# Patient Record
Sex: MALE | Race: OTHER | HISPANIC OR LATINO | URBAN - METROPOLITAN AREA
[De-identification: names, ages, dates, MRNs, and addresses within clinical notes are randomized per-mention and may not be internally consistent; named-entity substitution may affect disease eponyms.]

---

## 2018-04-13 ENCOUNTER — EMERGENCY (EMERGENCY)
Facility: HOSPITAL | Age: 53
LOS: 1 days | Discharge: HOME | End: 2018-04-13
Attending: EMERGENCY MEDICINE
Payer: MEDICAID

## 2018-04-13 VITALS
TEMPERATURE: 98 F | SYSTOLIC BLOOD PRESSURE: 142 MMHG | HEART RATE: 77 BPM | RESPIRATION RATE: 20 BRPM | OXYGEN SATURATION: 98 % | DIASTOLIC BLOOD PRESSURE: 87 MMHG

## 2018-04-13 DIAGNOSIS — Z98.89 OTHER SPECIFIED POSTPROCEDURAL STATES: Chronic | ICD-10-CM

## 2018-04-13 PROCEDURE — 72070 X-RAY EXAM THORAC SPINE 2VWS: CPT

## 2018-04-13 PROCEDURE — 99283 EMERGENCY DEPT VISIT LOW MDM: CPT | Mod: 25

## 2018-04-13 PROCEDURE — 72070 X-RAY EXAM THORAC SPINE 2VWS: CPT | Mod: 26

## 2018-04-13 PROCEDURE — 96372 THER/PROPH/DIAG INJ SC/IM: CPT

## 2018-04-13 PROCEDURE — T1013: CPT

## 2018-04-13 PROCEDURE — 99283 EMERGENCY DEPT VISIT LOW MDM: CPT

## 2018-04-13 RX ORDER — IBUPROFEN 200 MG
1 TABLET ORAL
Qty: 50 | Refills: 0 | OUTPATIENT
Start: 2018-04-13

## 2018-04-13 RX ORDER — KETOROLAC TROMETHAMINE 30 MG/ML
15 SYRINGE (ML) INJECTION ONCE
Qty: 0 | Refills: 0 | Status: DISCONTINUED | OUTPATIENT
Start: 2018-04-13 | End: 2018-04-13

## 2018-04-13 RX ORDER — METHOCARBAMOL 500 MG/1
1500 TABLET, FILM COATED ORAL ONCE
Qty: 0 | Refills: 0 | Status: COMPLETED | OUTPATIENT
Start: 2018-04-13 | End: 2018-04-13

## 2018-04-13 RX ADMIN — Medication 15 MILLIGRAM(S): at 14:05

## 2018-04-13 RX ADMIN — METHOCARBAMOL 1500 MILLIGRAM(S): 500 TABLET, FILM COATED ORAL at 14:05

## 2018-04-13 NOTE — ED PROVIDER NOTE - ATTENDING CONTRIBUTION TO CARE
mid back pain following an injury at gym.  no head or neck trauma.  no prior back problems.  no injury to arms/ legs.  PE;  nontoxic appearing, NAD, no gross motor abnormlities.

## 2018-04-13 NOTE — ED PROVIDER NOTE - OBJECTIVE STATEMENT
Patient presents c/o thoracic back pain. patient states was using a cable machine at gym when he slipped and fell backwards. patient c/o pain to thoracic back, no loc, no ha, no n/v. no radiation of pain, no weakness,

## 2018-04-13 NOTE — ED PROVIDER NOTE - PHYSICAL EXAMINATION
Musculoskeletal positive midline thoracic tenderness,  ROM intact, positive paraspinal muscle tenderness.  no stepoffs

## 2018-04-13 NOTE — ED ADULT TRIAGE NOTE - CHIEF COMPLAINT QUOTE
Pt was working with a machine at work and hurt his back. c/o back pain Pt was working with a machine at gym and hurt his back. c/o back pain

## 2018-08-14 ENCOUNTER — EMERGENCY (EMERGENCY)
Facility: HOSPITAL | Age: 53
LOS: 1 days | Discharge: HOME | End: 2018-08-14
Attending: EMERGENCY MEDICINE
Payer: COMMERCIAL

## 2018-08-14 VITALS
RESPIRATION RATE: 18 BRPM | TEMPERATURE: 98 F | HEART RATE: 58 BPM | HEIGHT: 64 IN | WEIGHT: 186.95 LBS | DIASTOLIC BLOOD PRESSURE: 88 MMHG | SYSTOLIC BLOOD PRESSURE: 154 MMHG | OXYGEN SATURATION: 97 %

## 2018-08-14 VITALS
SYSTOLIC BLOOD PRESSURE: 161 MMHG | TEMPERATURE: 98 F | HEART RATE: 56 BPM | OXYGEN SATURATION: 98 % | RESPIRATION RATE: 18 BRPM | DIASTOLIC BLOOD PRESSURE: 87 MMHG

## 2018-08-14 DIAGNOSIS — Z98.89 OTHER SPECIFIED POSTPROCEDURAL STATES: Chronic | ICD-10-CM

## 2018-08-14 PROCEDURE — T1013: CPT

## 2018-08-14 PROCEDURE — 99284 EMERGENCY DEPT VISIT MOD MDM: CPT

## 2018-08-14 PROCEDURE — 99283 EMERGENCY DEPT VISIT LOW MDM: CPT

## 2018-08-14 RX ORDER — ACETAMINOPHEN 500 MG
650 TABLET ORAL ONCE
Qty: 0 | Refills: 0 | Status: COMPLETED | OUTPATIENT
Start: 2018-08-14 | End: 2018-08-14

## 2018-08-14 RX ADMIN — Medication 650 MILLIGRAM(S): at 19:53

## 2018-08-14 NOTE — ED PROVIDER NOTE - PHYSICAL EXAMINATION
neuro: CN II - XII intact, EOMI, PERRL, no papilledema, 5/5 muscle strength x 4 extremities, no sensory deficits, 2+ dtr globally, negative babinski, no ataxic gait, normal MAULIK and FNT, normal romberg
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
## 2018-08-14 NOTE — ED PROVIDER NOTE - MEDICAL DECISION MAKING DETAILS
no + nexus criteria. return to ed for intractable HA, persistent vomiting, or new onset motor/sensory deficits

## 2018-08-14 NOTE — ED ADULT NURSE NOTE - NSIMPLEMENTINTERV_GEN_ALL_ED
Implemented All Universal Safety Interventions:  [place] to call system. Call bell, personal items and telephone within reach. Instruct patient to call for assistance. Room bathroom lighting operational. Non-slip footwear when patient is off stretcher. Physically safe environment: no spills, clutter or unnecessary equipment. Stretcher in lowest position, wheels locked, appropriate side rails in place.

## 2019-01-18 ENCOUNTER — EMERGENCY (EMERGENCY)
Facility: HOSPITAL | Age: 54
LOS: 1 days | Discharge: HOME | End: 2019-01-18
Attending: EMERGENCY MEDICINE
Payer: MEDICAID

## 2019-01-18 VITALS
TEMPERATURE: 98 F | SYSTOLIC BLOOD PRESSURE: 173 MMHG | OXYGEN SATURATION: 100 % | RESPIRATION RATE: 18 BRPM | HEART RATE: 69 BPM | DIASTOLIC BLOOD PRESSURE: 92 MMHG | HEIGHT: 65 IN | WEIGHT: 179.9 LBS

## 2019-01-18 VITALS
DIASTOLIC BLOOD PRESSURE: 69 MMHG | TEMPERATURE: 98 F | OXYGEN SATURATION: 90 % | RESPIRATION RATE: 18 BRPM | SYSTOLIC BLOOD PRESSURE: 143 MMHG | HEART RATE: 65 BPM

## 2019-01-18 DIAGNOSIS — Z98.89 OTHER SPECIFIED POSTPROCEDURAL STATES: Chronic | ICD-10-CM

## 2019-01-18 LAB
ALBUMIN SERPL ELPH-MCNC: 4 G/DL — SIGNIFICANT CHANGE UP (ref 3.3–5.2)
ALP SERPL-CCNC: 77 U/L — SIGNIFICANT CHANGE UP (ref 40–120)
ALT FLD-CCNC: 31 U/L — SIGNIFICANT CHANGE UP
ANION GAP SERPL CALC-SCNC: 13 MMOL/L — SIGNIFICANT CHANGE UP (ref 5–17)
APTT BLD: 31.6 SEC — SIGNIFICANT CHANGE UP (ref 27.5–36.3)
AST SERPL-CCNC: 35 U/L — SIGNIFICANT CHANGE UP
BILIRUB SERPL-MCNC: 0.9 MG/DL — SIGNIFICANT CHANGE UP (ref 0.4–2)
BUN SERPL-MCNC: 13 MG/DL — SIGNIFICANT CHANGE UP (ref 8–20)
CALCIUM SERPL-MCNC: 8.8 MG/DL — SIGNIFICANT CHANGE UP (ref 8.6–10.2)
CHLORIDE SERPL-SCNC: 102 MMOL/L — SIGNIFICANT CHANGE UP (ref 98–107)
CO2 SERPL-SCNC: 22 MMOL/L — SIGNIFICANT CHANGE UP (ref 22–29)
CREAT SERPL-MCNC: 0.54 MG/DL — SIGNIFICANT CHANGE UP (ref 0.5–1.3)
D DIMER BLD IA.RAPID-MCNC: <150 NG/ML DDU — SIGNIFICANT CHANGE UP
GLUCOSE SERPL-MCNC: 97 MG/DL — SIGNIFICANT CHANGE UP (ref 70–115)
HCT VFR BLD CALC: 47.1 % — SIGNIFICANT CHANGE UP (ref 42–52)
HGB BLD-MCNC: 16.3 G/DL — SIGNIFICANT CHANGE UP (ref 14–18)
INR BLD: 1.06 RATIO — SIGNIFICANT CHANGE UP (ref 0.88–1.16)
LIDOCAIN IGE QN: 30 U/L — SIGNIFICANT CHANGE UP (ref 22–51)
MAGNESIUM SERPL-MCNC: 2.1 MG/DL — SIGNIFICANT CHANGE UP (ref 1.6–2.6)
MCHC RBC-ENTMCNC: 30.1 PG — SIGNIFICANT CHANGE UP (ref 27–31)
MCHC RBC-ENTMCNC: 34.6 G/DL — SIGNIFICANT CHANGE UP (ref 32–36)
MCV RBC AUTO: 87.1 FL — SIGNIFICANT CHANGE UP (ref 80–94)
NT-PROBNP SERPL-SCNC: 8 PG/ML — SIGNIFICANT CHANGE UP (ref 0–300)
PLATELET # BLD AUTO: 229 K/UL — SIGNIFICANT CHANGE UP (ref 150–400)
POTASSIUM SERPL-MCNC: 4.4 MMOL/L — SIGNIFICANT CHANGE UP (ref 3.5–5.3)
POTASSIUM SERPL-SCNC: 4.4 MMOL/L — SIGNIFICANT CHANGE UP (ref 3.5–5.3)
PROT SERPL-MCNC: 7.2 G/DL — SIGNIFICANT CHANGE UP (ref 6.6–8.7)
PROTHROM AB SERPL-ACNC: 12.2 SEC — SIGNIFICANT CHANGE UP (ref 10–12.9)
RBC # BLD: 5.41 M/UL — SIGNIFICANT CHANGE UP (ref 4.6–6.2)
RBC # FLD: 13.2 % — SIGNIFICANT CHANGE UP (ref 11–15.6)
SODIUM SERPL-SCNC: 137 MMOL/L — SIGNIFICANT CHANGE UP (ref 135–145)
TROPONIN T SERPL-MCNC: <0.01 NG/ML — SIGNIFICANT CHANGE UP (ref 0–0.06)
TROPONIN T SERPL-MCNC: <0.01 NG/ML — SIGNIFICANT CHANGE UP (ref 0–0.06)
WBC # BLD: 5.3 K/UL — SIGNIFICANT CHANGE UP (ref 4.8–10.8)
WBC # FLD AUTO: 5.3 K/UL — SIGNIFICANT CHANGE UP (ref 4.8–10.8)

## 2019-01-18 PROCEDURE — 83735 ASSAY OF MAGNESIUM: CPT

## 2019-01-18 PROCEDURE — 85610 PROTHROMBIN TIME: CPT

## 2019-01-18 PROCEDURE — 93010 ELECTROCARDIOGRAM REPORT: CPT

## 2019-01-18 PROCEDURE — 84484 ASSAY OF TROPONIN QUANT: CPT

## 2019-01-18 PROCEDURE — 71045 X-RAY EXAM CHEST 1 VIEW: CPT | Mod: 26

## 2019-01-18 PROCEDURE — 99285 EMERGENCY DEPT VISIT HI MDM: CPT

## 2019-01-18 PROCEDURE — 85730 THROMBOPLASTIN TIME PARTIAL: CPT

## 2019-01-18 PROCEDURE — 85379 FIBRIN DEGRADATION QUANT: CPT

## 2019-01-18 PROCEDURE — 83690 ASSAY OF LIPASE: CPT

## 2019-01-18 PROCEDURE — 36415 COLL VENOUS BLD VENIPUNCTURE: CPT

## 2019-01-18 PROCEDURE — 80053 COMPREHEN METABOLIC PANEL: CPT

## 2019-01-18 PROCEDURE — 71045 X-RAY EXAM CHEST 1 VIEW: CPT

## 2019-01-18 PROCEDURE — T1013: CPT

## 2019-01-18 PROCEDURE — 85027 COMPLETE CBC AUTOMATED: CPT

## 2019-01-18 PROCEDURE — 99284 EMERGENCY DEPT VISIT MOD MDM: CPT | Mod: 25

## 2019-01-18 PROCEDURE — 83880 ASSAY OF NATRIURETIC PEPTIDE: CPT

## 2019-01-18 PROCEDURE — 96374 THER/PROPH/DIAG INJ IV PUSH: CPT

## 2019-01-18 PROCEDURE — 93005 ELECTROCARDIOGRAM TRACING: CPT

## 2019-01-18 RX ORDER — NITROGLYCERIN 6.5 MG
1 CAPSULE, EXTENDED RELEASE ORAL ONCE
Qty: 0 | Refills: 0 | Status: COMPLETED | OUTPATIENT
Start: 2019-01-18 | End: 2019-01-18

## 2019-01-18 RX ORDER — ASPIRIN/CALCIUM CARB/MAGNESIUM 324 MG
325 TABLET ORAL ONCE
Qty: 0 | Refills: 0 | Status: COMPLETED | OUTPATIENT
Start: 2019-01-18 | End: 2019-01-18

## 2019-01-18 RX ORDER — KETOROLAC TROMETHAMINE 30 MG/ML
15 SYRINGE (ML) INJECTION ONCE
Qty: 0 | Refills: 0 | Status: DISCONTINUED | OUTPATIENT
Start: 2019-01-18 | End: 2019-01-18

## 2019-01-18 RX ADMIN — Medication 15 MILLIGRAM(S): at 19:40

## 2019-01-18 RX ADMIN — Medication 325 MILLIGRAM(S): at 15:14

## 2019-01-18 RX ADMIN — Medication 1 INCH(S): at 15:14

## 2019-01-18 RX ADMIN — Medication 15 MILLIGRAM(S): at 19:25

## 2019-01-18 NOTE — ED PROVIDER NOTE - PROGRESS NOTE DETAILS
The patient presents with atypical chest pain worsening with movement and palpation.  Two trop negative and CXR and EKG negative.  Will DC home and will follow up with PMD

## 2019-01-18 NOTE — ED PROVIDER NOTE - OBJECTIVE STATEMENT
Pt is 52 yo male with no PMHx of cardiovascular disease present to ER co chest pain. Pain is constant, it stated this morning at 7;OO AM while taking a shower. Pain is 10/10, radiates to eft arm and back, nothing make it better but exacerbates with movements. Pt feels fatigue, week and sad since the chest pain started

## 2019-01-18 NOTE — ED PROVIDER NOTE - CHPI ED SYMPTOMS NEG
no dizziness/no fever/no nausea/no syncope/no vomiting/no cough/no shortness of breath/no back pain/no diaphoresis/no chills

## 2019-01-18 NOTE — ED PROVIDER NOTE - CARDIAC, MLM
Normal rate, regular rhythm.  Heart sounds S1, S2.  No murmurs, rubs or gallops. Normal rate, regular rhythm.  Heart sounds S1, S2.  No murmurs, rubs or gallops. +Chest Wall TTP on L side

## 2019-01-18 NOTE — ED ADULT NURSE NOTE - OBJECTIVE STATEMENT
assumed pt care at 1240. pt A&O X 4. States he has chest pain that radiates down left arm and to back since this morning. States he also has dyspnea on exertion and the chest pain becomes worse with respirations. No diaphoresis noted, color good. Denies any nausea, vomiting, dizziness, headaches, numbness/tingling. States he had abdominal surgery about 8 months ago but does not know exactly what was done. Chest pain currently 8/10. Safety maintained. NSR on monitor. Will continue to monitor.

## 2019-01-28 ENCOUNTER — EMERGENCY (EMERGENCY)
Facility: HOSPITAL | Age: 54
LOS: 1 days | Discharge: HOME | End: 2019-01-28
Attending: EMERGENCY MEDICINE
Payer: MEDICAID

## 2019-01-28 VITALS
OXYGEN SATURATION: 99 % | SYSTOLIC BLOOD PRESSURE: 145 MMHG | HEART RATE: 59 BPM | RESPIRATION RATE: 20 BRPM | DIASTOLIC BLOOD PRESSURE: 70 MMHG

## 2019-01-28 VITALS
DIASTOLIC BLOOD PRESSURE: 85 MMHG | OXYGEN SATURATION: 98 % | HEART RATE: 63 BPM | RESPIRATION RATE: 18 BRPM | TEMPERATURE: 98 F | HEIGHT: 66 IN | WEIGHT: 190.04 LBS | SYSTOLIC BLOOD PRESSURE: 137 MMHG

## 2019-01-28 DIAGNOSIS — Z98.89 OTHER SPECIFIED POSTPROCEDURAL STATES: Chronic | ICD-10-CM

## 2019-01-28 LAB
ALBUMIN SERPL ELPH-MCNC: 4.2 G/DL — SIGNIFICANT CHANGE UP (ref 3.3–5.2)
ALP SERPL-CCNC: 83 U/L — SIGNIFICANT CHANGE UP (ref 40–120)
ALT FLD-CCNC: 25 U/L — SIGNIFICANT CHANGE UP
ANION GAP SERPL CALC-SCNC: 6 MMOL/L — SIGNIFICANT CHANGE UP (ref 5–17)
AST SERPL-CCNC: 25 U/L — SIGNIFICANT CHANGE UP
BILIRUB SERPL-MCNC: 0.7 MG/DL — SIGNIFICANT CHANGE UP (ref 0.4–2)
BUN SERPL-MCNC: 15 MG/DL — SIGNIFICANT CHANGE UP (ref 8–20)
CALCIUM SERPL-MCNC: 9.1 MG/DL — SIGNIFICANT CHANGE UP (ref 8.6–10.2)
CHLORIDE SERPL-SCNC: 105 MMOL/L — SIGNIFICANT CHANGE UP (ref 98–107)
CO2 SERPL-SCNC: 30 MMOL/L — HIGH (ref 22–29)
CREAT SERPL-MCNC: 0.79 MG/DL — SIGNIFICANT CHANGE UP (ref 0.5–1.3)
GLUCOSE SERPL-MCNC: 109 MG/DL — SIGNIFICANT CHANGE UP (ref 70–115)
HCT VFR BLD CALC: 45.3 % — SIGNIFICANT CHANGE UP (ref 42–52)
HGB BLD-MCNC: 15 G/DL — SIGNIFICANT CHANGE UP (ref 14–18)
LIDOCAIN IGE QN: 42 U/L — SIGNIFICANT CHANGE UP (ref 22–51)
MAGNESIUM SERPL-MCNC: 2.3 MG/DL — SIGNIFICANT CHANGE UP (ref 1.6–2.6)
MCHC RBC-ENTMCNC: 28.6 PG — SIGNIFICANT CHANGE UP (ref 27–31)
MCHC RBC-ENTMCNC: 33.1 G/DL — SIGNIFICANT CHANGE UP (ref 32–36)
MCV RBC AUTO: 86.3 FL — SIGNIFICANT CHANGE UP (ref 80–94)
NT-PROBNP SERPL-SCNC: 15 PG/ML — SIGNIFICANT CHANGE UP (ref 0–300)
PLATELET # BLD AUTO: 221 K/UL — SIGNIFICANT CHANGE UP (ref 150–400)
POTASSIUM SERPL-MCNC: 4.1 MMOL/L — SIGNIFICANT CHANGE UP (ref 3.5–5.3)
POTASSIUM SERPL-SCNC: 4.1 MMOL/L — SIGNIFICANT CHANGE UP (ref 3.5–5.3)
PROT SERPL-MCNC: 7.4 G/DL — SIGNIFICANT CHANGE UP (ref 6.6–8.7)
RBC # BLD: 5.25 M/UL — SIGNIFICANT CHANGE UP (ref 4.6–6.2)
RBC # FLD: 12.8 % — SIGNIFICANT CHANGE UP (ref 11–15.6)
SODIUM SERPL-SCNC: 141 MMOL/L — SIGNIFICANT CHANGE UP (ref 135–145)
TROPONIN T SERPL-MCNC: <0.01 NG/ML — SIGNIFICANT CHANGE UP (ref 0–0.06)
WBC # BLD: 4.8 K/UL — SIGNIFICANT CHANGE UP (ref 4.8–10.8)
WBC # FLD AUTO: 4.8 K/UL — SIGNIFICANT CHANGE UP (ref 4.8–10.8)

## 2019-01-28 PROCEDURE — 83735 ASSAY OF MAGNESIUM: CPT

## 2019-01-28 PROCEDURE — 93005 ELECTROCARDIOGRAM TRACING: CPT

## 2019-01-28 PROCEDURE — 84484 ASSAY OF TROPONIN QUANT: CPT

## 2019-01-28 PROCEDURE — 83690 ASSAY OF LIPASE: CPT

## 2019-01-28 PROCEDURE — 99284 EMERGENCY DEPT VISIT MOD MDM: CPT | Mod: 25

## 2019-01-28 PROCEDURE — 80053 COMPREHEN METABOLIC PANEL: CPT

## 2019-01-28 PROCEDURE — T1013: CPT

## 2019-01-28 PROCEDURE — 85027 COMPLETE CBC AUTOMATED: CPT

## 2019-01-28 PROCEDURE — 99285 EMERGENCY DEPT VISIT HI MDM: CPT | Mod: 25

## 2019-01-28 PROCEDURE — 83880 ASSAY OF NATRIURETIC PEPTIDE: CPT

## 2019-01-28 PROCEDURE — 71046 X-RAY EXAM CHEST 2 VIEWS: CPT | Mod: 26

## 2019-01-28 PROCEDURE — 75574 CT ANGIO HRT W/3D IMAGE: CPT | Mod: 26

## 2019-01-28 PROCEDURE — 36415 COLL VENOUS BLD VENIPUNCTURE: CPT

## 2019-01-28 PROCEDURE — 93010 ELECTROCARDIOGRAM REPORT: CPT

## 2019-01-28 PROCEDURE — 71046 X-RAY EXAM CHEST 2 VIEWS: CPT

## 2019-01-28 PROCEDURE — 75574 CT ANGIO HRT W/3D IMAGE: CPT

## 2019-01-28 RX ORDER — IBUPROFEN 200 MG
600 TABLET ORAL ONCE
Qty: 0 | Refills: 0 | Status: COMPLETED | OUTPATIENT
Start: 2019-01-28 | End: 2019-01-28

## 2019-01-28 RX ORDER — METOPROLOL TARTRATE 50 MG
50 TABLET ORAL ONCE
Qty: 0 | Refills: 0 | Status: COMPLETED | OUTPATIENT
Start: 2019-01-28 | End: 2019-01-28

## 2019-01-28 RX ADMIN — Medication 600 MILLIGRAM(S): at 06:38

## 2019-01-28 RX ADMIN — Medication 600 MILLIGRAM(S): at 07:23

## 2019-01-28 NOTE — ED PROVIDER NOTE - OBJECTIVE STATEMENT
54 yo male presents for evaluation of chest pain. Patient states he was seen in the [facility] one week ago. He continues to have pain. He was seen by [facility] whom has scheduled further testing ( ECHO, stress test, labs) this Wednesday. He states the pain is daily but intermittent. No discernible pattern. Patient also complains of episode of nausea and dyspnea.   : Jessica  Cards: [facility]

## 2019-01-28 NOTE — ED PROVIDER NOTE - ENMT, MLM
98.1 Airway patent, Nasal mucosa clear. Mouth with normal mucosa. Throat has no vesicles, no oropharyngeal exudates and uvula is midline.

## 2019-01-28 NOTE — ED PROVIDER NOTE - PROGRESS NOTE DETAILS
Received patient signout from Dr. Limon. Patient with chest pain seen in ER recently followed up with [facility] scheduled for outpatient workup c/o continued pain.  Pending cardiac CTA.

## 2019-01-28 NOTE — ED ADULT NURSE REASSESSMENT NOTE - NS ED NURSE REASSESS COMMENT FT1
assumed pt care this am, for cardiac CT, will call them. HR is 56. will keep NPO for now, chart reviewed

## 2019-01-28 NOTE — ED ADULT NURSE NOTE - OBJECTIVE STATEMENT
Patient received alert and oriented x4 states that he has had pain x9 days, states that he was seen here last week for the same and the pain has not subsided. patient does not appear to be in any respiratory distress, states that he does need a cardiac stress test and consult with cardiologist

## 2019-01-28 NOTE — CONSULT NOTE ADULT - SUBJECTIVE AND OBJECTIVE BOX
[facility], [facility]                                          [address], [address], [place] [address]                                               PHONE: [phone]    FAX: [phone] [address], [address], [place] [address]                                       PHONE: [phone]    FAX: [phone]  *******************************************************************************    Reason for Consult: Chest pain    HPI:  JAMIA SUTHERLAND is a 53y man with no prior medical history who presents to the ER for evaluation of chest pain.  The patient reports constant left sided chest pain and upper back pain for the last 1-2 weeks.  The pain is worse when sitting in certain positions and with movements but no relation to exertion.  The pain is non-radiating.  No diaphoresis, palpitations, shortness of breath, syncope or near syncope.     PAST MEDICAL & SURGICAL HISTORY:  No pertinent past medical history  S/P shoulder surgery  S/P hernia surgery      No Known Allergies      MEDICATIONS  (STANDING):  metoprolol tartrate 50 milliGRAM(s) Oral Once    MEDICATIONS  (PRN):      Social History: no active tobacco / EtOH / IVDA    Family History: No pertinent family history in first degree relatives      ROS: As noted above, otherwise unremarkable.    Vital Signs Last 24 Hrs  T(C): 36.6 (28 Jan 2019 05:53), Max: 36.6 (28 Jan 2019 05:53)  T(F): 97.8 (28 Jan 2019 05:53), Max: 97.8 (28 Jan 2019 05:53)  HR: 56 (28 Jan 2019 07:21) (56 - 63)  BP: 166/82 (28 Jan 2019 07:21) (137/85 - 166/82)  RR: 18 (28 Jan 2019 07:21) (18 - 18)  SpO2: 100% (28 Jan 2019 07:21) (98% - 100%)    I&O's Detail    I&O's Summary      PHYSICAL EXAM:  General: Appears well developed, well nourished, no acute distress  HEENT: Head: normocephalic, atraumatic  Eyes: Pupils equal and reactive  Neck: Supple, no carotid bruit, no JVD, no HJR  CARDIOVASCULAR: Normal S1 and S2, no murmur, rub, or gallop  LUNGS: Clear to auscultation bilaterally, no rales, rhonchi or wheeze  CHEST: There is tenderness to palpation about anterior chest wall and upper back which reproduces the patient's reported pain.  ABDOMEN: Soft, nontender, non-distended, positive bowel sounds, no mass or bruit  EXTREMITIES: No edema, distal pulses WNL  SKIN: Warm and dry with normal turgor  NEURO: Alert & oriented x 3, grossly intact  PSYCH: normal mood and affect    LABS:                        15.0   4.8   )-----------( 221      ( 28 Jan 2019 06:41 )             45.3     01-28    141  |  105  |  15.0  ----------------------------<  109  4.1   |  30.0<H>  |  0.79    Ca    9.1      28 Jan 2019 06:41  Mg     2.3     01-28    TPro  7.4  /  Alb  4.2  /  TBili  0.7  /  DBili  x   /  AST  25  /  ALT  25  /  AlkPhos  83  01-28    CARDIAC MARKERS ( 28 Jan 2019 06:41 )  x     / <0.01 ng/mL / x     / x     / x          RADIOLOGY & ADDITIONAL STUDIES:     ECG: sinus rhythm    Assessment and Plan:  In summary, JAMIA SUTHERLAND is a 53y man with no prior medical history admitted for evaluation of chest pain.     - The patient's pain is atypical and most likely noncardiac.  He is without ischemic ECG abnormalities and negative serial troponin and has now ruled out for acute MI.   - No evidence of ischemia or CHF clinically, eventual ischemic evaluation (likely as outpatient)  - Rhythm/hemodynamics stable = BP intermittently elevated in ER likely secondary to pain.  Continue to monitor off medications for now.   - Echo and stress testing scheduled in office.    - Stable for discharge from cardiac perspective.     Thank you for allowing me to participate in the care of your patient.      Sincerely,    Hayden Hernandez M.D.

## 2019-10-16 ENCOUNTER — EMERGENCY (EMERGENCY)
Facility: HOSPITAL | Age: 54
LOS: 1 days | Discharge: HOME | End: 2019-10-16
Attending: EMERGENCY MEDICINE
Payer: MEDICAID

## 2019-10-16 VITALS
HEART RATE: 64 BPM | OXYGEN SATURATION: 100 % | DIASTOLIC BLOOD PRESSURE: 93 MMHG | SYSTOLIC BLOOD PRESSURE: 167 MMHG | RESPIRATION RATE: 16 BRPM

## 2019-10-16 VITALS
HEIGHT: 65 IN | OXYGEN SATURATION: 99 % | TEMPERATURE: 98 F | DIASTOLIC BLOOD PRESSURE: 95 MMHG | HEART RATE: 69 BPM | RESPIRATION RATE: 18 BRPM | WEIGHT: 190.04 LBS | SYSTOLIC BLOOD PRESSURE: 160 MMHG

## 2019-10-16 DIAGNOSIS — Z98.89 OTHER SPECIFIED POSTPROCEDURAL STATES: Chronic | ICD-10-CM

## 2019-10-16 LAB
ALBUMIN SERPL ELPH-MCNC: 4.7 G/DL — SIGNIFICANT CHANGE UP (ref 3.3–5.2)
ALP SERPL-CCNC: 83 U/L — SIGNIFICANT CHANGE UP (ref 40–120)
ALT FLD-CCNC: 24 U/L — SIGNIFICANT CHANGE UP
ANION GAP SERPL CALC-SCNC: 12 MMOL/L — SIGNIFICANT CHANGE UP (ref 5–17)
APTT BLD: 31.6 SEC — SIGNIFICANT CHANGE UP (ref 27.5–36.3)
AST SERPL-CCNC: 28 U/L — SIGNIFICANT CHANGE UP
BASOPHILS # BLD AUTO: 0.04 K/UL — SIGNIFICANT CHANGE UP (ref 0–0.2)
BASOPHILS NFR BLD AUTO: 0.4 % — SIGNIFICANT CHANGE UP (ref 0–2)
BILIRUB SERPL-MCNC: 0.9 MG/DL — SIGNIFICANT CHANGE UP (ref 0.4–2)
BUN SERPL-MCNC: 14 MG/DL — SIGNIFICANT CHANGE UP (ref 8–20)
CALCIUM SERPL-MCNC: 9.5 MG/DL — SIGNIFICANT CHANGE UP (ref 8.6–10.2)
CHLORIDE SERPL-SCNC: 100 MMOL/L — SIGNIFICANT CHANGE UP (ref 98–107)
CO2 SERPL-SCNC: 26 MMOL/L — SIGNIFICANT CHANGE UP (ref 22–29)
CREAT SERPL-MCNC: 0.72 MG/DL — SIGNIFICANT CHANGE UP (ref 0.5–1.3)
EOSINOPHIL # BLD AUTO: 0.45 K/UL — SIGNIFICANT CHANGE UP (ref 0–0.5)
EOSINOPHIL NFR BLD AUTO: 4.2 % — SIGNIFICANT CHANGE UP (ref 0–6)
GLUCOSE SERPL-MCNC: 115 MG/DL — SIGNIFICANT CHANGE UP (ref 70–115)
HCT VFR BLD CALC: 52 % — HIGH (ref 39–50)
HGB BLD-MCNC: 17.1 G/DL — HIGH (ref 13–17)
IMM GRANULOCYTES NFR BLD AUTO: 0.5 % — SIGNIFICANT CHANGE UP (ref 0–1.5)
INR BLD: 1.1 RATIO — SIGNIFICANT CHANGE UP (ref 0.88–1.16)
LYMPHOCYTES # BLD AUTO: 1.93 K/UL — SIGNIFICANT CHANGE UP (ref 1–3.3)
LYMPHOCYTES # BLD AUTO: 17.9 % — SIGNIFICANT CHANGE UP (ref 13–44)
MCHC RBC-ENTMCNC: 29.4 PG — SIGNIFICANT CHANGE UP (ref 27–34)
MCHC RBC-ENTMCNC: 32.9 GM/DL — SIGNIFICANT CHANGE UP (ref 32–36)
MCV RBC AUTO: 89.5 FL — SIGNIFICANT CHANGE UP (ref 80–100)
MONOCYTES # BLD AUTO: 0.6 K/UL — SIGNIFICANT CHANGE UP (ref 0–0.9)
MONOCYTES NFR BLD AUTO: 5.6 % — SIGNIFICANT CHANGE UP (ref 2–14)
NEUTROPHILS # BLD AUTO: 7.72 K/UL — HIGH (ref 1.8–7.4)
NEUTROPHILS NFR BLD AUTO: 71.4 % — SIGNIFICANT CHANGE UP (ref 43–77)
PLATELET # BLD AUTO: 224 K/UL — SIGNIFICANT CHANGE UP (ref 150–400)
POTASSIUM SERPL-MCNC: 4.4 MMOL/L — SIGNIFICANT CHANGE UP (ref 3.5–5.3)
POTASSIUM SERPL-SCNC: 4.4 MMOL/L — SIGNIFICANT CHANGE UP (ref 3.5–5.3)
PROT SERPL-MCNC: 8.1 G/DL — SIGNIFICANT CHANGE UP (ref 6.6–8.7)
PROTHROM AB SERPL-ACNC: 12.7 SEC — SIGNIFICANT CHANGE UP (ref 10–12.9)
RBC # BLD: 5.81 M/UL — HIGH (ref 4.2–5.8)
RBC # FLD: 12.4 % — SIGNIFICANT CHANGE UP (ref 10.3–14.5)
SODIUM SERPL-SCNC: 138 MMOL/L — SIGNIFICANT CHANGE UP (ref 135–145)
TROPONIN T SERPL-MCNC: <0.01 NG/ML — SIGNIFICANT CHANGE UP (ref 0–0.06)
WBC # BLD: 10.79 K/UL — HIGH (ref 3.8–10.5)
WBC # FLD AUTO: 10.79 K/UL — HIGH (ref 3.8–10.5)

## 2019-10-16 PROCEDURE — 71046 X-RAY EXAM CHEST 2 VIEWS: CPT

## 2019-10-16 PROCEDURE — 85610 PROTHROMBIN TIME: CPT

## 2019-10-16 PROCEDURE — 85027 COMPLETE CBC AUTOMATED: CPT

## 2019-10-16 PROCEDURE — T1013: CPT

## 2019-10-16 PROCEDURE — 99284 EMERGENCY DEPT VISIT MOD MDM: CPT

## 2019-10-16 PROCEDURE — 85730 THROMBOPLASTIN TIME PARTIAL: CPT

## 2019-10-16 PROCEDURE — 73030 X-RAY EXAM OF SHOULDER: CPT | Mod: 26,LT

## 2019-10-16 PROCEDURE — 99284 EMERGENCY DEPT VISIT MOD MDM: CPT | Mod: 25

## 2019-10-16 PROCEDURE — 71046 X-RAY EXAM CHEST 2 VIEWS: CPT | Mod: 26

## 2019-10-16 PROCEDURE — 93005 ELECTROCARDIOGRAM TRACING: CPT

## 2019-10-16 PROCEDURE — 80053 COMPREHEN METABOLIC PANEL: CPT

## 2019-10-16 PROCEDURE — 36415 COLL VENOUS BLD VENIPUNCTURE: CPT

## 2019-10-16 PROCEDURE — 93010 ELECTROCARDIOGRAM REPORT: CPT

## 2019-10-16 PROCEDURE — 96372 THER/PROPH/DIAG INJ SC/IM: CPT

## 2019-10-16 PROCEDURE — 73030 X-RAY EXAM OF SHOULDER: CPT

## 2019-10-16 PROCEDURE — 84484 ASSAY OF TROPONIN QUANT: CPT

## 2019-10-16 RX ORDER — TRIAMCINOLONE 4 MG
40 TABLET ORAL ONCE
Refills: 0 | Status: COMPLETED | OUTPATIENT
Start: 2019-10-16 | End: 2019-10-16

## 2019-10-16 RX ORDER — METHOCARBAMOL 500 MG/1
1500 TABLET, FILM COATED ORAL ONCE
Refills: 0 | Status: COMPLETED | OUTPATIENT
Start: 2019-10-16 | End: 2019-10-16

## 2019-10-16 RX ORDER — KETOROLAC TROMETHAMINE 30 MG/ML
30 SYRINGE (ML) INJECTION ONCE
Refills: 0 | Status: DISCONTINUED | OUTPATIENT
Start: 2019-10-16 | End: 2019-10-16

## 2019-10-16 RX ORDER — BUPIVACAINE HCL/PF 7.5 MG/ML
5 VIAL (ML) INJECTION ONCE
Refills: 0 | Status: DISCONTINUED | OUTPATIENT
Start: 2019-10-16 | End: 2019-10-16

## 2019-10-16 RX ADMIN — Medication 30 MILLIGRAM(S): at 17:04

## 2019-10-16 RX ADMIN — METHOCARBAMOL 1500 MILLIGRAM(S): 500 TABLET, FILM COATED ORAL at 17:03

## 2019-10-16 RX ADMIN — Medication 40 MILLIGRAM(S): at 18:46

## 2019-10-16 NOTE — ED PROVIDER NOTE - NS ED ROS FT
ROS: +CP no SOB. +shoulder pain no cough. no fever. no n/v/d/c. no abd pain. no rash. no bleeding. no urinary complaints. no weakness. no vision changes. no HA. no neck/back pain. no extremity swelling/deformity. No change in mental status.

## 2019-10-16 NOTE — ED PROVIDER NOTE - CLINICAL SUMMARY MEDICAL DECISION MAKING FREE TEXT BOX
patient with shoulder pain worse with movement, likely rotator cuff injury or calcific bursitis, bursitis. no fevers/IVDA. no direct trauma. radiates into chest but not associated with exertion. no other cardiac sx or risk factors. has noted CP in past with exertion but cannot recall last time. HEART score 1, will xray possible steroid injection. labs with trop x1 reasses

## 2019-10-16 NOTE — ED STATDOCS - CLINICAL SUMMARY MEDICAL DECISION MAKING FREE TEXT BOX
I, Eva Pepe, performed the initial face to face bedside interview with this patient regarding history of present illness and determined that the patient should be seen in the main ED.

## 2019-10-16 NOTE — ED PROVIDER NOTE - PLAN OF CARE
1. Return to ED for worsening, progressive or any other concerning symptoms   2. Follow up with your primary care doctor in 2-3days   3. Take motrin 600mg every 6 hours as needed for pain and Take Tylenol up to 650 mg every 6 hours as needed for pain.   4. Rest, apply ice over covered skin for no more than 15 minutes at a time, keep affected extremity elevated, use compressive dressing or splint as provided and instructed.   5. Follow up with orthopedic clinic [phone].   Open Thursday 1-9pm  Saturday 8-4pm   6. Return to ED for rash, swelling, fevers, or worsening pain.  7. Follow up with [facility] [phone].

## 2019-10-16 NOTE — ED PROVIDER NOTE - PATIENT PORTAL LINK FT
You can access the FollowMyHealth Patient Portal offered by [facility] by registering at the following website: [email or web address]. By joining "[employer]’s FollowMyHealth portal, you will also be able to view your health information using other applications (apps) compatible with our system.

## 2019-10-16 NOTE — ED PROVIDER NOTE - PROGRESS NOTE DETAILS
pain improved, still present with movement, CE negative, mild leukocytosis likely reactive/stress, no infectious symptoms. xray negative. likely bursititis, will give steroid injection. outpt Follow up -Derrick HIGGINBOTHAM patient also given cardiology Follow up

## 2019-10-16 NOTE — ED PROVIDER NOTE - CARE PLAN
Principal Discharge DX:	Acute pain of left shoulder  Assessment and plan of treatment:	1. Return to ED for worsening, progressive or any other concerning symptoms   2. Follow up with your primary care doctor in 2-3days   3. Take motrin 600mg every 6 hours as needed for pain and Take Tylenol up to 650 mg every 6 hours as needed for pain.   4. Rest, apply ice over covered skin for no more than 15 minutes at a time, keep affected extremity elevated, use compressive dressing or splint as provided and instructed.   5. Follow up with orthopedic clinic [phone].   Open Thursday 1-9pm  Saturday 8-4pm   6. Return to ED for rash, swelling, fevers, or worsening pain.  7. Follow up with [facility] [phone].
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
## 2019-10-16 NOTE — ED PROVIDER NOTE - PHYSICAL EXAMINATION
Gen: NAD, AOx3  Head: NCAT  HEENT: PERRL, EOMI, oral mucosa moist, normal conjunctiva, neck supple  Lung: CTAB, no respiratory distress  CV: rrr, no murmur, Normal perfusion  Abd: soft, NTND, no CVA tenderness  MSK: No edema, no visible deformities, +ttp left trapezius and left shoulder joint without appreciated effusion no warmth/erythema, +pain with flexion/extenal rotation of left shoulder limited ROM due to pain, 5/5  strength, ROM @ elbow intact. no midline spinal ttp. no reproducible chst wall pain   Neuro: No focal neurologic deficits  Skin: No rash   Psych: normal affect

## 2019-10-16 NOTE — ED ADULT TRIAGE NOTE - CHIEF COMPLAINT QUOTE
Patient arrived to the ED from home, patient states that he has been having progressively worsening upper left back pain/shoulder pain since yesterday with associated tingling in his left hand. Patient states that 5 months ago he fell and was having back pain. Denies any additional trauma, fevers, IV drug use, steroids or hx of cancer. Patient states now that pain is radiating to his chest and is a throbbing sensation

## 2019-10-16 NOTE — ED PROVIDER NOTE - OBJECTIVE STATEMENT
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 no fevers/chills. no IVDA. pain radiates into upper back and anteiror chest. no SOB. has had CP at work in past different that this- when he works really hard. no PMH. no cardiac history. nonsmoker. no weakness/paresthesias in the arm. no direct trauma

## 2020-03-31 ENCOUNTER — EMERGENCY (EMERGENCY)
Facility: HOSPITAL | Age: 55
LOS: 1 days | Discharge: HOME | End: 2020-03-31
Attending: EMERGENCY MEDICINE
Payer: MEDICAID

## 2020-03-31 VITALS
SYSTOLIC BLOOD PRESSURE: 157 MMHG | TEMPERATURE: 98 F | HEART RATE: 78 BPM | OXYGEN SATURATION: 98 % | DIASTOLIC BLOOD PRESSURE: 99 MMHG | WEIGHT: 184.97 LBS | RESPIRATION RATE: 18 BRPM

## 2020-03-31 DIAGNOSIS — Z98.89 OTHER SPECIFIED POSTPROCEDURAL STATES: Chronic | ICD-10-CM

## 2020-03-31 PROCEDURE — 93010 ELECTROCARDIOGRAM REPORT: CPT

## 2020-03-31 PROCEDURE — 99284 EMERGENCY DEPT VISIT MOD MDM: CPT

## 2020-03-31 PROCEDURE — T1013: CPT

## 2020-03-31 PROCEDURE — 99283 EMERGENCY DEPT VISIT LOW MDM: CPT

## 2020-03-31 PROCEDURE — 93005 ELECTROCARDIOGRAM TRACING: CPT

## 2020-03-31 NOTE — ED PROVIDER NOTE - NSFOLLOWUPINSTRUCTIONS_ED_ALL_ED_FT
follow up PMD in 48 hours  return to ER if any increase in symptoms   tylenol for fever   increase fluids follow up PMD in 48 hours  return to ER if any increase in symptoms   tylenol for fever   increase fluids      No work x 14 days

## 2020-03-31 NOTE — ED PROVIDER NOTE - PATIENT PORTAL LINK FT
You can access the FollowMyHealth Patient Portal offered by [facility] by registering at the following website: [email or web address]. By joining [employer]’s FollowMyHealth portal, you will also be able to view your health information using other applications (apps) compatible with our system.

## 2020-03-31 NOTE — ED ADULT TRIAGE NOTE - CHIEF COMPLAINT QUOTE
Patient alert and oriented x3 c/o subjective fevers, with reproducible chest pain only when he coughs. denies n/v/d. breathing unlabored. talking in complete sentences with out difficulty.

## 2020-03-31 NOTE — ED PROVIDER NOTE - OBJECTIVE STATEMENT
patient is a 54 year old male who presents c/o cough, fever, chills x 1 week. patient c/o chest discomfort when coughing.  patient states no dyspnea, no n/v/d, no abdominal pain   patient is non smoker denies medical history

## 2020-03-31 NOTE — ED PROVIDER NOTE - ATTENDING CONTRIBUTION TO CARE
54 year old man with one week of cough, fever, and chills.  Patient non-toxic appearing, lungs clear no hypoxia.  Patient instructed on supportive care and to return to the ER if breathing worsens.

## 2020-09-04 ENCOUNTER — EMERGENCY (EMERGENCY)
Facility: HOSPITAL | Age: 55
LOS: 1 days | Discharge: HOME | End: 2020-09-04
Attending: EMERGENCY MEDICINE
Payer: MEDICAID

## 2020-09-04 VITALS
SYSTOLIC BLOOD PRESSURE: 176 MMHG | WEIGHT: 188.94 LBS | OXYGEN SATURATION: 97 % | DIASTOLIC BLOOD PRESSURE: 96 MMHG | TEMPERATURE: 98 F | HEIGHT: 62 IN | HEART RATE: 63 BPM | RESPIRATION RATE: 18 BRPM

## 2020-09-04 VITALS
TEMPERATURE: 98 F | DIASTOLIC BLOOD PRESSURE: 87 MMHG | HEART RATE: 55 BPM | OXYGEN SATURATION: 98 % | RESPIRATION RATE: 18 BRPM | SYSTOLIC BLOOD PRESSURE: 137 MMHG

## 2020-09-04 DIAGNOSIS — Z90.49 ACQUIRED ABSENCE OF OTHER SPECIFIED PARTS OF DIGESTIVE TRACT: Chronic | ICD-10-CM

## 2020-09-04 DIAGNOSIS — Z98.89 OTHER SPECIFIED POSTPROCEDURAL STATES: Chronic | ICD-10-CM

## 2020-09-04 LAB
ALBUMIN SERPL ELPH-MCNC: 3.9 G/DL — SIGNIFICANT CHANGE UP (ref 3.3–5.2)
ALP SERPL-CCNC: 82 U/L — SIGNIFICANT CHANGE UP (ref 40–120)
ALT FLD-CCNC: 36 U/L — SIGNIFICANT CHANGE UP
ANION GAP SERPL CALC-SCNC: 12 MMOL/L — SIGNIFICANT CHANGE UP (ref 5–17)
APPEARANCE UR: CLEAR — SIGNIFICANT CHANGE UP
AST SERPL-CCNC: 30 U/L — SIGNIFICANT CHANGE UP
BASOPHILS # BLD AUTO: 0.04 K/UL — SIGNIFICANT CHANGE UP (ref 0–0.2)
BASOPHILS NFR BLD AUTO: 0.6 % — SIGNIFICANT CHANGE UP (ref 0–2)
BILIRUB SERPL-MCNC: 0.4 MG/DL — SIGNIFICANT CHANGE UP (ref 0.4–2)
BILIRUB UR-MCNC: NEGATIVE — SIGNIFICANT CHANGE UP
BUN SERPL-MCNC: 13 MG/DL — SIGNIFICANT CHANGE UP (ref 8–20)
CALCIUM SERPL-MCNC: 9.1 MG/DL — SIGNIFICANT CHANGE UP (ref 8.6–10.2)
CHLORIDE SERPL-SCNC: 104 MMOL/L — SIGNIFICANT CHANGE UP (ref 98–107)
CO2 SERPL-SCNC: 26 MMOL/L — SIGNIFICANT CHANGE UP (ref 22–29)
COLOR SPEC: YELLOW — SIGNIFICANT CHANGE UP
CREAT SERPL-MCNC: 0.75 MG/DL — SIGNIFICANT CHANGE UP (ref 0.5–1.3)
DIFF PNL FLD: NEGATIVE — SIGNIFICANT CHANGE UP
EOSINOPHIL # BLD AUTO: 0.94 K/UL — HIGH (ref 0–0.5)
EOSINOPHIL NFR BLD AUTO: 15 % — HIGH (ref 0–6)
GLUCOSE SERPL-MCNC: 114 MG/DL — HIGH (ref 70–99)
GLUCOSE UR QL: NEGATIVE MG/DL — SIGNIFICANT CHANGE UP
HCT VFR BLD CALC: 46.3 % — SIGNIFICANT CHANGE UP (ref 39–50)
HGB BLD-MCNC: 15.4 G/DL — SIGNIFICANT CHANGE UP (ref 13–17)
IMM GRANULOCYTES NFR BLD AUTO: 0.3 % — SIGNIFICANT CHANGE UP (ref 0–1.5)
KETONES UR-MCNC: NEGATIVE — SIGNIFICANT CHANGE UP
LEUKOCYTE ESTERASE UR-ACNC: NEGATIVE — SIGNIFICANT CHANGE UP
LIDOCAIN IGE QN: 34 U/L — SIGNIFICANT CHANGE UP (ref 22–51)
LYMPHOCYTES # BLD AUTO: 1.87 K/UL — SIGNIFICANT CHANGE UP (ref 1–3.3)
LYMPHOCYTES # BLD AUTO: 29.8 % — SIGNIFICANT CHANGE UP (ref 13–44)
MCHC RBC-ENTMCNC: 30.2 PG — SIGNIFICANT CHANGE UP (ref 27–34)
MCHC RBC-ENTMCNC: 33.3 GM/DL — SIGNIFICANT CHANGE UP (ref 32–36)
MCV RBC AUTO: 90.8 FL — SIGNIFICANT CHANGE UP (ref 80–100)
MONOCYTES # BLD AUTO: 0.62 K/UL — SIGNIFICANT CHANGE UP (ref 0–0.9)
MONOCYTES NFR BLD AUTO: 9.9 % — SIGNIFICANT CHANGE UP (ref 2–14)
NEUTROPHILS # BLD AUTO: 2.79 K/UL — SIGNIFICANT CHANGE UP (ref 1.8–7.4)
NEUTROPHILS NFR BLD AUTO: 44.4 % — SIGNIFICANT CHANGE UP (ref 43–77)
NITRITE UR-MCNC: NEGATIVE — SIGNIFICANT CHANGE UP
PH UR: 6 — SIGNIFICANT CHANGE UP (ref 5–8)
PLATELET # BLD AUTO: 212 K/UL — SIGNIFICANT CHANGE UP (ref 150–400)
POTASSIUM SERPL-MCNC: 4.3 MMOL/L — SIGNIFICANT CHANGE UP (ref 3.5–5.3)
POTASSIUM SERPL-SCNC: 4.3 MMOL/L — SIGNIFICANT CHANGE UP (ref 3.5–5.3)
PROT SERPL-MCNC: 6.9 G/DL — SIGNIFICANT CHANGE UP (ref 6.6–8.7)
PROT UR-MCNC: NEGATIVE MG/DL — SIGNIFICANT CHANGE UP
RBC # BLD: 5.1 M/UL — SIGNIFICANT CHANGE UP (ref 4.2–5.8)
RBC # FLD: 12.5 % — SIGNIFICANT CHANGE UP (ref 10.3–14.5)
RBC CASTS # UR COMP ASSIST: NEGATIVE /HPF — SIGNIFICANT CHANGE UP (ref 0–4)
SODIUM SERPL-SCNC: 142 MMOL/L — SIGNIFICANT CHANGE UP (ref 135–145)
SP GR SPEC: 1.01 — SIGNIFICANT CHANGE UP (ref 1.01–1.02)
UROBILINOGEN FLD QL: NEGATIVE MG/DL — SIGNIFICANT CHANGE UP
WBC # BLD: 6.28 K/UL — SIGNIFICANT CHANGE UP (ref 3.8–10.5)
WBC # FLD AUTO: 6.28 K/UL — SIGNIFICANT CHANGE UP (ref 3.8–10.5)
WBC UR QL: SIGNIFICANT CHANGE UP

## 2020-09-04 PROCEDURE — 74177 CT ABD & PELVIS W/CONTRAST: CPT | Mod: 26

## 2020-09-04 PROCEDURE — 99285 EMERGENCY DEPT VISIT HI MDM: CPT

## 2020-09-04 PROCEDURE — 81001 URINALYSIS AUTO W/SCOPE: CPT

## 2020-09-04 PROCEDURE — 85027 COMPLETE CBC AUTOMATED: CPT

## 2020-09-04 PROCEDURE — 93010 ELECTROCARDIOGRAM REPORT: CPT

## 2020-09-04 PROCEDURE — 99284 EMERGENCY DEPT VISIT MOD MDM: CPT

## 2020-09-04 PROCEDURE — 74177 CT ABD & PELVIS W/CONTRAST: CPT

## 2020-09-04 PROCEDURE — 87591 N.GONORRHOEAE DNA AMP PROB: CPT

## 2020-09-04 PROCEDURE — 87491 CHLMYD TRACH DNA AMP PROBE: CPT

## 2020-09-04 PROCEDURE — 93005 ELECTROCARDIOGRAM TRACING: CPT

## 2020-09-04 PROCEDURE — 36415 COLL VENOUS BLD VENIPUNCTURE: CPT

## 2020-09-04 PROCEDURE — T1013: CPT

## 2020-09-04 PROCEDURE — 83690 ASSAY OF LIPASE: CPT

## 2020-09-04 PROCEDURE — 80053 COMPREHEN METABOLIC PANEL: CPT

## 2020-09-04 RX ORDER — ACETAMINOPHEN 500 MG
650 TABLET ORAL ONCE
Refills: 0 | Status: COMPLETED | OUTPATIENT
Start: 2020-09-04 | End: 2020-09-04

## 2020-09-04 RX ADMIN — Medication 650 MILLIGRAM(S): at 09:12

## 2020-09-04 NOTE — ED PROVIDER NOTE - CLINICAL SUMMARY MEDICAL DECISION MAKING FREE TEXT BOX
56 yo male presents w right sided abdominal pain over the past three days along w dysuria. Consider appendicitis, SBO, pyelonephritis, UTI. Will order CT abdomen/pelvis, abdominal labs to investigate for further pathology. 56 yo male presents w right sided abdominal pain over the past three days along w dysuria. Consider appendicitis, SBO, pyelonephritis, UTI. Will order CT abdomen/pelvis, abdominal labs to investigate for further pathology. EKG to assess patient's chest pain complaint.

## 2020-09-04 NOTE — ED PROVIDER NOTE - ATTENDING CONTRIBUTION TO CARE
I, Sowmya Meza, have personally seen and examined this patient. I have fully participated in the care of this patient. I have reviewed all pertinent clinical information, including history, physical exam, plan and the Resident's note and agree except as noted below. I, Sowmya Meza, have personally seen and examined this patient. I have fully participated in the care of this patient. I have reviewed all pertinent clinical information, including history, physical exam, plan and the Resident's note and agree except as noted below.    54yo M with no sign PMH with 3 days of progressively worsening abd pain, pain cramping intermittent raidates across abdomen into rt groin and back. mild dysuria. no penile discharge. subjective fevers. pain has been present before but more severe since starting new job. no trauma. feels at times testicles swollen- chronic but worsening now painful, currently not swollen. no drugs/alcohol. no h/o STDs. has chronic intermittent CP/SOB- but no acute changes and no sx now. /chaperone: Alisa    Gen: NAD, AOx3  Head: NCAT  HEENT: EOMI, oral mucosa moist, normal conjunctiva, neck supple  Lung: CTAB, no respiratory distress  CV: rrr, no murmur, Normal perfusion  Abd: soft, +ttp rt abdomen near level of umbilicus into rt flank region, negative murphys/mcburneys, ND, +mild rt CVA tenderness, no ttp testicles, discomfort with palpation of spermatic cord but no masses or hernia present  MSK: No edema, no visible deformities  Neuro: No focal neurologic deficits  Skin: No rash   Psych: normal affect     patient with intermitent lower abd pain into flank groin, subjective fevers. potential urolithiasis/pyelo, appy, unliekly SBO/hernia. labs, ct, urine, reasses

## 2020-09-04 NOTE — ED PROVIDER NOTE - PROGRESS NOTE DETAILS
Patient resting comfortably. Pending CT results. Reviewed CT results showing colonic diverticuli with no evidence of diverticulosis, calcification of the prostate. Will recommend the patient to followup with GI, Urology.

## 2020-09-04 NOTE — ED PROVIDER NOTE - CARE PROVIDER_API CALL
Noah Wiley J  GASTROENTEROLOGY  [address], [address]  [place] [address]  Phone: [phone]  Fax: [phone]  Follow Up Time: 7-10 Days    Jacky Liang  UROLOGY  [address] [place] [address]  Phone: [phone]  Fax: [phone]  Follow Up Time:

## 2020-09-04 NOTE — ED ADULT NURSE NOTE - OBJECTIVE STATEMENT
Pt is here with c/o R sided abd pain with N/V  and back pain and dysuria  for the past 24 hours.  Also c/o groin pain for the past 8 yrs.  Denies hematuria fever.

## 2020-09-04 NOTE — ED ADULT TRIAGE NOTE - CHIEF COMPLAINT QUOTE
Pt states, "I have been having right sided abdominal/back/chest pain x1 day and irritation in my groin x 8 years". C/o sob, dysuria, itching, redness, nausea, vomiting. Denies any penile discharge. Denies any medical history. Pt states he has not taken any medication for the pain or groin irritation.

## 2020-09-04 NOTE — ED PROVIDER NOTE - PATIENT PORTAL LINK FT
You can access the FollowMyHealth Patient Portal offered by [facility] by registering at the following website: [email or web address]. By joining [employer]’s FollowMyHealth portal, you will also be able to view your health information using other applications (apps) compatible with our system.

## 2020-09-04 NOTE — ED PROVIDER NOTE - OBJECTIVE STATEMENT
56 yo male presents w several days of right sided abdominal pain. He states that he has been experiencing right sided abdominal pain over the past 3 days. He states that pain is pressure-like in nature, radiates to his back and now across the remainder of his abdomen. He reports associated nausea, denies vomiting. Patient states that the last Additionally, the patient reports right sided testicle pain over the past eight years, worse over the past 3 days with the abdominal pain. He is also reporting dysuria over the past few days as well. Further the patient is reporting mid sternal chest pain that radiates across both sides of his chest. Patient does report subjective fever three days ago, he did not take his temperature at this time. He denies SOB, hematuria, penile discharge, 56 yo male presents w several days of right sided abdominal pain. He states that he has been experiencing right sided abdominal pain over the past 3 days. He states that pain is pressure-like in nature, radiates to his back and now across the remainder of his abdomen. He reports associated nausea, denies vomiting. Patient states that the last Additionally, the patient reports right sided testicle pain over the past eight years, worse over the past 3 days with the abdominal pain. He is also reporting dysuria over the past few days as well. Further the patient is reporting mid sternal chest pain that radiates across both sides of his chest. Patient does report subjective fever three days ago, he did not take his temperature at this time. He denies SOB, hematuria, penile discharge, diarrhea, melena.

## 2020-09-05 LAB
C TRACH RRNA SPEC QL NAA+PROBE: SIGNIFICANT CHANGE UP
N GONORRHOEA RRNA SPEC QL NAA+PROBE: SIGNIFICANT CHANGE UP
SPECIMEN SOURCE: SIGNIFICANT CHANGE UP

## 2020-09-09 PROBLEM — Z00.00 ENCOUNTER FOR PREVENTIVE HEALTH EXAMINATION: Status: ACTIVE | Noted: 2020-09-09

## 2020-09-29 NOTE — ED ADULT TRIAGE NOTE - WEIGHT IN LBS
184.9 V-Y Flap Text: The defect edges were debeveled with a #15 scalpel blade.  Given the location of the defect, shape of the defect and the proximity to free margins a V-Y flap was deemed most appropriate.  Using a sterile surgical marker, an appropriate advancement flap was drawn incorporating the defect and placing the expected incisions within the relaxed skin tension lines where possible.    The area thus outlined was incised deep to adipose tissue with a #15 scalpel blade.  The skin margins were undermined to an appropriate distance in all directions utilizing iris scissors.

## 2020-11-12 ENCOUNTER — APPOINTMENT (OUTPATIENT)
Dept: GASTROENTEROLOGY | Facility: CLINIC | Age: 55
End: 2020-11-12
Payer: MEDICAID

## 2020-11-12 VITALS
BODY MASS INDEX: 36.44 KG/M2 | HEART RATE: 90 BPM | RESPIRATION RATE: 15 BRPM | HEIGHT: 62 IN | TEMPERATURE: 97.2 F | SYSTOLIC BLOOD PRESSURE: 130 MMHG | DIASTOLIC BLOOD PRESSURE: 82 MMHG | OXYGEN SATURATION: 97 % | WEIGHT: 198 LBS

## 2020-11-12 PROCEDURE — 82272 OCCULT BLD FECES 1-3 TESTS: CPT

## 2020-11-12 PROCEDURE — 99204 OFFICE O/P NEW MOD 45 MIN: CPT

## 2020-11-12 PROCEDURE — 99072 ADDL SUPL MATRL&STAF TM PHE: CPT

## 2020-11-12 NOTE — PHYSICAL EXAM
[General Appearance - Alert] : alert [General Appearance - In No Acute Distress] : in no acute distress [Sclera] : the sclera and conjunctiva were normal [PERRL With Normal Accommodation] : pupils were equal in size, round, and reactive to light [Extraocular Movements] : extraocular movements were intact [Neck Appearance] : the appearance of the neck was normal [Neck Cervical Mass (___cm)] : no neck mass was observed [Jugular Venous Distention Increased] : there was no jugular-venous distention [Thyroid Diffuse Enlargement] : the thyroid was not enlarged [Thyroid Nodule] : there were no palpable thyroid nodules [Auscultation Breath Sounds / Voice Sounds] : lungs were clear to auscultation bilaterally [Heart Rate And Rhythm] : heart rate was normal and rhythm regular [Heart Sounds] : normal S1 and S2 [Heart Sounds Gallop] : no gallops [Murmurs] : no murmurs [Heart Sounds Pericardial Friction Rub] : no pericardial rub [Edema] : there was no peripheral edema [Bowel Sounds] : normal bowel sounds [Abdomen Soft] : soft [Abdomen Tenderness] : non-tender [] : no hepato-splenomegaly [Abdomen Mass (___ Cm)] : no abdominal mass palpated [Normal Sphincter Tone] : normal sphincter tone [No Rectal Mass] : no rectal mass [Occult Blood Positive] : stool was negative for occult blood [No CVA Tenderness] : no ~M costovertebral angle tenderness [Musculoskeletal - Swelling] : no joint swelling seen

## 2020-11-12 NOTE — HISTORY OF PRESENT ILLNESS
[de-identified] : patient with several month history of left-sided abdominal pain. He says this started after his cholecystectomy. He's also complains of constipation as well. The pain seems to wake him from sleep is not related to bowel movements or meals. He has no nausea vomiting or weight loss.He went to seea physician and an urgent center who says they sent him for some blood work however he suddenly returned with a request for a CAT scanfrom a surgeon's office. The patient has no family history of colonic neoplasm or inflammatory bowel disease and has never had a colonoscopy.Patient also says that he had some rectal bleeding the past although not now and was treated for hemorrhoids by a surgeon.

## 2020-11-12 NOTE — ASSESSMENT
[FreeTextEntry1] : I discussed with the patient through an  that she should get an abdominal pelvic CT scan to evaluate etiology of his pain.Given his prior history rectal bleeding and pain and constipation pending the results of the CT scan he will most likely need a colonoscopy as well. He will also get routine blood work including a CBC and CMP and call for the results of both the blood work and CT scan to determine further treatment and evaluation.

## 2021-04-15 ENCOUNTER — APPOINTMENT (OUTPATIENT)
Dept: GASTROENTEROLOGY | Facility: CLINIC | Age: 56
End: 2021-04-15
Payer: MEDICAID

## 2021-04-15 VITALS
OXYGEN SATURATION: 98 % | SYSTOLIC BLOOD PRESSURE: 130 MMHG | DIASTOLIC BLOOD PRESSURE: 82 MMHG | TEMPERATURE: 97.1 F | RESPIRATION RATE: 14 BRPM | BODY MASS INDEX: 28.52 KG/M2 | WEIGHT: 155 LBS | HEART RATE: 71 BPM | HEIGHT: 62 IN

## 2021-04-15 DIAGNOSIS — R10.31 RIGHT LOWER QUADRANT PAIN: ICD-10-CM

## 2021-04-15 PROCEDURE — 99072 ADDL SUPL MATRL&STAF TM PHE: CPT

## 2021-04-15 PROCEDURE — 99214 OFFICE O/P EST MOD 30 MIN: CPT

## 2021-04-15 NOTE — ASSESSMENT
[FreeTextEntry1] : I discussed with the patient through Maltese translation that his symptoms could just be due to constipation but certainly given the fact that they have persisted over the last 5 or 6 months and now is in the right lower quadrant as well as the fact that he probably is never had a colonoscopy it would be appropriate to do a colonoscopy to evaluate the etiology of symptoms to rule a colorectal neoplasm.  In the interim I advised him to take MiraLAX 17 g once a day and water to see if this helps ameliorate his symptoms.  He also get routine blood work including CBC and CMP the indications benefits risks alternatives to colonoscopy were discussed with the patient and he has agreed and is medically optimal for the planned procedure

## 2021-04-15 NOTE — HISTORY OF PRESENT ILLNESS
[de-identified] : Patient initially seen here about 5 months ago with complaints of left lower quadrant pain and had an abdominal pelvic CT scan which was unremarkable.  At that time he was advised that if his CT was negative we will consider doing a colonoscopy given the fact he is not sure if he ever had one and certainly been many years.  Patient presents today now with right lower quadrant pain and rectal burning.  He also has constipation and struggles every morning to move his bowels and his stools are scybalous in nature.  He has no rectal bleeding or family history of colorectal neoplasm.  He has no nausea vomiting or weight loss.  He says he takes fiber but still having problems.

## 2021-04-15 NOTE — PHYSICAL EXAM
[General Appearance - Alert] : alert [General Appearance - In No Acute Distress] : in no acute distress [Sclera] : the sclera and conjunctiva were normal [PERRL With Normal Accommodation] : pupils were equal in size, round, and reactive to light [Extraocular Movements] : extraocular movements were intact [Auscultation Breath Sounds / Voice Sounds] : lungs were clear to auscultation bilaterally [Heart Rate And Rhythm] : heart rate was normal and rhythm regular [Heart Sounds] : normal S1 and S2 [Heart Sounds Gallop] : no gallops [Murmurs] : no murmurs [Heart Sounds Pericardial Friction Rub] : no pericardial rub [Bowel Sounds] : normal bowel sounds [Abdomen Soft] : soft [Abdomen Tenderness] : non-tender [] : no hepato-splenomegaly [Abdomen Mass (___ Cm)] : no abdominal mass palpated [Oriented To Time, Place, And Person] : oriented to person, place, and time [Affect] : the affect was normal [Impaired Insight] : insight and judgment were intact

## 2021-04-16 LAB
ALBUMIN SERPL ELPH-MCNC: 4.4 G/DL
ALP BLD-CCNC: 80 U/L
ALT SERPL-CCNC: 24 U/L
ANION GAP SERPL CALC-SCNC: 9 MMOL/L
AST SERPL-CCNC: 25 U/L
BASOPHILS # BLD AUTO: 0.04 K/UL
BASOPHILS NFR BLD AUTO: 0.6 %
BILIRUB SERPL-MCNC: 0.6 MG/DL
BUN SERPL-MCNC: 12 MG/DL
CALCIUM SERPL-MCNC: 9.3 MG/DL
CHLORIDE SERPL-SCNC: 104 MMOL/L
CO2 SERPL-SCNC: 27 MMOL/L
CREAT SERPL-MCNC: 0.99 MG/DL
EOSINOPHIL # BLD AUTO: 0.93 K/UL
EOSINOPHIL NFR BLD AUTO: 13.3 %
GLUCOSE SERPL-MCNC: 108 MG/DL
HCT VFR BLD CALC: 48.6 %
HGB BLD-MCNC: 15.9 G/DL
IMM GRANULOCYTES NFR BLD AUTO: 0.3 %
LYMPHOCYTES # BLD AUTO: 1.98 K/UL
LYMPHOCYTES NFR BLD AUTO: 28.4 %
MAN DIFF?: NORMAL
MCHC RBC-ENTMCNC: 29.7 PG
MCHC RBC-ENTMCNC: 32.7 GM/DL
MCV RBC AUTO: 90.8 FL
MONOCYTES # BLD AUTO: 0.53 K/UL
MONOCYTES NFR BLD AUTO: 7.6 %
NEUTROPHILS # BLD AUTO: 3.47 K/UL
NEUTROPHILS NFR BLD AUTO: 49.8 %
PLATELET # BLD AUTO: 217 K/UL
POTASSIUM SERPL-SCNC: 4.6 MMOL/L
PROT SERPL-MCNC: 7.2 G/DL
RBC # BLD: 5.35 M/UL
RBC # FLD: 12.7 %
SODIUM SERPL-SCNC: 140 MMOL/L
WBC # FLD AUTO: 6.97 K/UL

## 2021-05-07 DIAGNOSIS — Z01.818 ENCOUNTER FOR OTHER PREPROCEDURAL EXAMINATION: ICD-10-CM

## 2021-05-08 ENCOUNTER — APPOINTMENT (OUTPATIENT)
Dept: DISASTER EMERGENCY | Facility: CLINIC | Age: 56
End: 2021-05-08

## 2021-05-09 LAB — SARS-COV-2 N GENE NPH QL NAA+PROBE: NOT DETECTED

## 2021-05-11 ENCOUNTER — APPOINTMENT (OUTPATIENT)
Dept: GASTROENTEROLOGY | Facility: GI CENTER | Age: 56
End: 2021-05-11
Payer: MEDICAID

## 2021-05-11 ENCOUNTER — OUTPATIENT (OUTPATIENT)
Dept: OUTPATIENT SERVICES | Facility: HOSPITAL | Age: 56
LOS: 1 days | End: 2021-05-11
Payer: MEDICAID

## 2021-05-11 DIAGNOSIS — K59.00 CONSTIPATION, UNSPECIFIED: ICD-10-CM

## 2021-05-11 DIAGNOSIS — Z90.49 ACQUIRED ABSENCE OF OTHER SPECIFIED PARTS OF DIGESTIVE TRACT: Chronic | ICD-10-CM

## 2021-05-11 DIAGNOSIS — Z98.89 OTHER SPECIFIED POSTPROCEDURAL STATES: Chronic | ICD-10-CM

## 2021-05-11 DIAGNOSIS — K57.30 DIVERTICULOSIS OF LARGE INTESTINE W/OUT PERFORATION OR ABSCESS W/OUT BLEEDING: ICD-10-CM

## 2021-05-11 DIAGNOSIS — R10.32 LEFT LOWER QUADRANT PAIN: ICD-10-CM

## 2021-05-11 PROCEDURE — 45378 DIAGNOSTIC COLONOSCOPY: CPT

## 2021-05-11 NOTE — PROCEDURE
[With Biopsy] : without biopsy [Change in Bowel Habits] : change in bowel habits [Abdominal Pain] : abdominal pain [Rectal Pain] : rectal pain [Procedure Explained] : The procedure was explained [Allergies Reviewed] : allergies reviewed. [Risks] : Risks [Benefits] : benefits [Alternatives] : alternatives [Bleeding] : bleeding risk [Consent Obtained] : written consent was obtained prior to the procedure and is detailed in the patient's record [Patient] : the patient [Automated Blood Pressure Cuff] : automated blood pressure cuff [Cardiac Monitor] : cardiac monitor [Pulse Oximeter] : pulse oximeter [Propofol ___ mg IV] : Propofol [unfilled] ~Umg intravenously [___ L/min Oxygen via NC] : [unfilled] ~Uliters/minute oxygen via nasal cannula [Prep Qualtiy: ___] : Prep Quality:  [unfilled] [Withdrawal Time: ___] : Withdrawal Time:  [unfilled] [Cecum (Landmarks/Transillum)] : and guided to the cecum which was identified by the anatomic landmarks of the appendiceal orifice and ileocecal valve and by transillumination in the right lower quadrant [Diverticulosis] : diverticulosis [Normal] : Normal [Tolerated Well] : the patient tolerated the procedure well

## 2021-06-14 ENCOUNTER — EMERGENCY (EMERGENCY)
Facility: HOSPITAL | Age: 56
LOS: 1 days | Discharge: HOME | End: 2021-06-14
Attending: EMERGENCY MEDICINE
Payer: MEDICAID

## 2021-06-14 VITALS
DIASTOLIC BLOOD PRESSURE: 98 MMHG | RESPIRATION RATE: 18 BRPM | WEIGHT: 195.11 LBS | HEIGHT: 64 IN | OXYGEN SATURATION: 98 % | TEMPERATURE: 98 F | HEART RATE: 80 BPM | SYSTOLIC BLOOD PRESSURE: 137 MMHG

## 2021-06-14 VITALS
OXYGEN SATURATION: 100 % | DIASTOLIC BLOOD PRESSURE: 78 MMHG | RESPIRATION RATE: 16 BRPM | SYSTOLIC BLOOD PRESSURE: 144 MMHG | HEART RATE: 51 BPM | TEMPERATURE: 98 F

## 2021-06-14 DIAGNOSIS — Z98.89 OTHER SPECIFIED POSTPROCEDURAL STATES: Chronic | ICD-10-CM

## 2021-06-14 DIAGNOSIS — Z90.49 ACQUIRED ABSENCE OF OTHER SPECIFIED PARTS OF DIGESTIVE TRACT: Chronic | ICD-10-CM

## 2021-06-14 LAB
ALBUMIN SERPL ELPH-MCNC: 4.1 G/DL — SIGNIFICANT CHANGE UP (ref 3.3–5.2)
ALP SERPL-CCNC: 80 U/L — SIGNIFICANT CHANGE UP (ref 40–120)
ALT FLD-CCNC: 29 U/L — SIGNIFICANT CHANGE UP
ANION GAP SERPL CALC-SCNC: 10 MMOL/L — SIGNIFICANT CHANGE UP (ref 5–17)
AST SERPL-CCNC: 32 U/L — SIGNIFICANT CHANGE UP
BASOPHILS # BLD AUTO: 0.03 K/UL — SIGNIFICANT CHANGE UP (ref 0–0.2)
BASOPHILS NFR BLD AUTO: 0.4 % — SIGNIFICANT CHANGE UP (ref 0–2)
BILIRUB SERPL-MCNC: 0.4 MG/DL — SIGNIFICANT CHANGE UP (ref 0.4–2)
BUN SERPL-MCNC: 13.5 MG/DL — SIGNIFICANT CHANGE UP (ref 8–20)
CALCIUM SERPL-MCNC: 9.1 MG/DL — SIGNIFICANT CHANGE UP (ref 8.6–10.2)
CHLORIDE SERPL-SCNC: 101 MMOL/L — SIGNIFICANT CHANGE UP (ref 98–107)
CO2 SERPL-SCNC: 27 MMOL/L — SIGNIFICANT CHANGE UP (ref 22–29)
CREAT SERPL-MCNC: 0.96 MG/DL — SIGNIFICANT CHANGE UP (ref 0.5–1.3)
EOSINOPHIL # BLD AUTO: 0.76 K/UL — HIGH (ref 0–0.5)
EOSINOPHIL NFR BLD AUTO: 11.3 % — HIGH (ref 0–6)
GLUCOSE SERPL-MCNC: 127 MG/DL — HIGH (ref 70–99)
HCT VFR BLD CALC: 47.5 % — SIGNIFICANT CHANGE UP (ref 39–50)
HGB BLD-MCNC: 15.8 G/DL — SIGNIFICANT CHANGE UP (ref 13–17)
IMM GRANULOCYTES NFR BLD AUTO: 0.3 % — SIGNIFICANT CHANGE UP (ref 0–1.5)
LIDOCAIN IGE QN: 41 U/L — SIGNIFICANT CHANGE UP (ref 22–51)
LYMPHOCYTES # BLD AUTO: 2.19 K/UL — SIGNIFICANT CHANGE UP (ref 1–3.3)
LYMPHOCYTES # BLD AUTO: 32.4 % — SIGNIFICANT CHANGE UP (ref 13–44)
MCHC RBC-ENTMCNC: 29.5 PG — SIGNIFICANT CHANGE UP (ref 27–34)
MCHC RBC-ENTMCNC: 33.3 GM/DL — SIGNIFICANT CHANGE UP (ref 32–36)
MCV RBC AUTO: 88.6 FL — SIGNIFICANT CHANGE UP (ref 80–100)
MONOCYTES # BLD AUTO: 0.5 K/UL — SIGNIFICANT CHANGE UP (ref 0–0.9)
MONOCYTES NFR BLD AUTO: 7.4 % — SIGNIFICANT CHANGE UP (ref 2–14)
NEUTROPHILS # BLD AUTO: 3.25 K/UL — SIGNIFICANT CHANGE UP (ref 1.8–7.4)
NEUTROPHILS NFR BLD AUTO: 48.2 % — SIGNIFICANT CHANGE UP (ref 43–77)
PLATELET # BLD AUTO: 207 K/UL — SIGNIFICANT CHANGE UP (ref 150–400)
POTASSIUM SERPL-MCNC: 3.9 MMOL/L — SIGNIFICANT CHANGE UP (ref 3.5–5.3)
POTASSIUM SERPL-SCNC: 3.9 MMOL/L — SIGNIFICANT CHANGE UP (ref 3.5–5.3)
PROT SERPL-MCNC: 7.4 G/DL — SIGNIFICANT CHANGE UP (ref 6.6–8.7)
RBC # BLD: 5.36 M/UL — SIGNIFICANT CHANGE UP (ref 4.2–5.8)
RBC # FLD: 12.8 % — SIGNIFICANT CHANGE UP (ref 10.3–14.5)
SODIUM SERPL-SCNC: 138 MMOL/L — SIGNIFICANT CHANGE UP (ref 135–145)
TROPONIN T SERPL-MCNC: <0.01 NG/ML — SIGNIFICANT CHANGE UP (ref 0–0.06)
WBC # BLD: 6.75 K/UL — SIGNIFICANT CHANGE UP (ref 3.8–10.5)
WBC # FLD AUTO: 6.75 K/UL — SIGNIFICANT CHANGE UP (ref 3.8–10.5)

## 2021-06-14 PROCEDURE — 71045 X-RAY EXAM CHEST 1 VIEW: CPT

## 2021-06-14 PROCEDURE — 36415 COLL VENOUS BLD VENIPUNCTURE: CPT

## 2021-06-14 PROCEDURE — 96374 THER/PROPH/DIAG INJ IV PUSH: CPT

## 2021-06-14 PROCEDURE — 84484 ASSAY OF TROPONIN QUANT: CPT

## 2021-06-14 PROCEDURE — 99284 EMERGENCY DEPT VISIT MOD MDM: CPT

## 2021-06-14 PROCEDURE — 99284 EMERGENCY DEPT VISIT MOD MDM: CPT | Mod: 25

## 2021-06-14 PROCEDURE — 80053 COMPREHEN METABOLIC PANEL: CPT

## 2021-06-14 PROCEDURE — 71045 X-RAY EXAM CHEST 1 VIEW: CPT | Mod: 26

## 2021-06-14 PROCEDURE — 85025 COMPLETE CBC W/AUTO DIFF WBC: CPT

## 2021-06-14 PROCEDURE — 83690 ASSAY OF LIPASE: CPT

## 2021-06-14 RX ORDER — KETOROLAC TROMETHAMINE 30 MG/ML
15 SYRINGE (ML) INJECTION ONCE
Refills: 0 | Status: DISCONTINUED | OUTPATIENT
Start: 2021-06-14 | End: 2021-06-14

## 2021-06-14 RX ADMIN — Medication 15 MILLIGRAM(S): at 19:37

## 2021-06-14 NOTE — ED PROVIDER NOTE - CARE PROVIDER_API CALL
Keon Will)  Cardiovascular Disease  [address], [place] [address]  Phone: [phone]  Fax: [phone]  Follow Up Time:    Keon Will (MD)  Cardiovascular Disease  [address], [address]  [place] [address]  Phone: [phone]  Fax: [phone]  Follow Up Time:     Robert Amaya (DO)  Orthopaedic Surgery  [address]  [place] [address]  Phone: [phone]  Fax: [phone]  Follow Up Time:

## 2021-06-14 NOTE — ED PROVIDER NOTE - PATIENT PORTAL LINK FT
You can access the FollowMyHealth Patient Portal offered by [facility] by registering at the following website: [email or web address]. By joining [employer]’s FollowMyHealth portal, you will also be able to view your health information using other applications (apps) compatible with our system.

## 2021-06-14 NOTE — ED PROVIDER NOTE - PHYSICAL EXAMINATION
Pt. awake and alert. No acute distress. Pt. awake and alert. No acute distress.  EXT- FROM of b/l lower extremities. Sensation intact to b/l lower ext.   NO focal deficit.

## 2021-06-14 NOTE — ED PROVIDER NOTE - PROGRESS NOTE DETAILS
PT. re-evaluated. Pt. states that his chest pain is less. Labs and x-ray findings discussed with patient. Pt. stable for discharge. Will give outpatient cardiology referral. Pt. also was c/o numbness along lateral aspect of his leg for the past few months. I spoke to the patient with the  to explain to him that he needs to follow up with his PMD. I will also give the patient the name and number to our spine specialist.

## 2021-06-14 NOTE — ED PROVIDER NOTE - CLINICAL SUMMARY MEDICAL DECISION MAKING FREE TEXT BOX
Pt. present to ED from Urgent care for diffuse abdominal pain radiating to left side of chest and neck area for the past few days. Pt. has hx of HTN but no other significant cardiac risk factors. EKG- NSR@66. Will check labs/CXR treat pain and re-evaluate.

## 2021-06-14 NOTE — ED ADULT NURSE REASSESSMENT NOTE - NS ED NURSE REASSESS COMMENT FT1
Report received from outgoing RN Anai for continuity of care. Patient reports minimal CP, medicated as ordered. CCM in place. Pending dispo. Safety maintained, needs attended, will continue to monitor.

## 2021-06-14 NOTE — ED PROVIDER NOTE - PMH
HTN (hypertension)    No pertinent past medical history    No pertinent past medical history     <<----- Click to add NO pertinent Past Medical History

## 2021-06-14 NOTE — ED PROVIDER NOTE - OBJECTIVE STATEMENT
Pt. with hx of HTN present to ED from UC due to intermittent abdominal pain for the past 4 days. Pt. also c/o chest pain for the past 2 days. Pt. states that the chest pain got worst today. Pain today, started around the abdomen region and it radiated up to left side of his chest and neck. No SOB. NO diaphoresis. No fever. NO dizziness. Pt. with normal EKG. Pt. in no acute distress.

## 2021-06-14 NOTE — ED PROVIDER NOTE - PROVIDER TOKENS
PROVIDER:[TOKEN:[[id number]]] PROVIDER:[TOKEN:[[id number]]],PROVIDER:[TOKEN:[[id number]]]

## 2021-06-14 NOTE — ED PROVIDER NOTE - CARE PROVIDERS DIRECT ADDRESSES
[email or web address]

## 2021-07-08 ENCOUNTER — APPOINTMENT (OUTPATIENT)
Dept: GASTROENTEROLOGY | Facility: CLINIC | Age: 56
End: 2021-07-08
Payer: MEDICAID

## 2021-07-08 VITALS
BODY MASS INDEX: 37.31 KG/M2 | SYSTOLIC BLOOD PRESSURE: 122 MMHG | DIASTOLIC BLOOD PRESSURE: 90 MMHG | WEIGHT: 204 LBS | HEART RATE: 67 BPM | TEMPERATURE: 98 F | RESPIRATION RATE: 14 BRPM | OXYGEN SATURATION: 98 %

## 2021-07-08 DIAGNOSIS — Z78.9 OTHER SPECIFIED HEALTH STATUS: ICD-10-CM

## 2021-07-08 DIAGNOSIS — R20.8 OTHER DISTURBANCES OF SKIN SENSATION: ICD-10-CM

## 2021-07-08 PROCEDURE — 99214 OFFICE O/P EST MOD 30 MIN: CPT

## 2021-07-08 PROCEDURE — 82272 OCCULT BLD FECES 1-3 TESTS: CPT

## 2021-07-08 RX ORDER — HYDROCHLOROTHIAZIDE 25 MG/1
25 TABLET ORAL
Refills: 0 | Status: ACTIVE | COMMUNITY

## 2021-07-08 RX ORDER — POLYETHYLENE GLYCOL-3350 AND ELECTROLYTES WITH FLAVOR PACK 240; 5.84; 2.98; 6.72; 22.72 G/278.26G; G/278.26G; G/278.26G; G/278.26G; G/278.26G
240 POWDER, FOR SOLUTION ORAL
Qty: 1 | Refills: 0 | Status: COMPLETED | COMMUNITY
Start: 2021-04-15 | End: 2021-07-08

## 2021-07-08 RX ORDER — GABAPENTIN 100 MG
100 TABLET ORAL
Refills: 0 | Status: ACTIVE | COMMUNITY

## 2021-07-08 RX ORDER — HYDROCORTISONE 25 MG/G
2.5 CREAM TOPICAL 3 TIMES DAILY
Qty: 30 | Refills: 1 | Status: ACTIVE | COMMUNITY
Start: 2021-07-08 | End: 1900-01-01

## 2021-07-08 NOTE — ASSESSMENT
[FreeTextEntry1] : Impression: Rectal burning and pain likely secondary to perianal inflammation seen on rectal examination today.  Patient is status post a negative colonoscopy in May of this year.\par \par Recommendations: Hydrocortisone 2.5% cream to be applied topically to the perianal region once or twice daily.  He is to return here in 3 months time for follow-up evaluation and appeared to understand all of the above instructions and management plan which were explained to him today in Luxembourgish by myself who speaks Luxembourgish.

## 2021-07-08 NOTE — REVIEW OF SYSTEMS
[As Noted in HPI] : as noted in HPI [Negative] : Heme/Lymph [Abdominal Pain] : no abdominal pain [Vomiting] : no vomiting [Constipation] : no constipation [Diarrhea] : no diarrhea [Heartburn] : no heartburn [Melena] : no melena [FreeTextEntry7] : Perirectal pain

## 2021-07-08 NOTE — PHYSICAL EXAM
[General Appearance - Alert] : alert [General Appearance - In No Acute Distress] : in no acute distress [General Appearance - Well Nourished] : well nourished [General Appearance - Well Developed] : well developed [General Appearance - Well-Appearing] : healthy appearing [Sclera] : the sclera and conjunctiva were normal [PERRL With Normal Accommodation] : pupils were equal in size, round, and reactive to light [Extraocular Movements] : extraocular movements were intact [Outer Ear] : the ears and nose were normal in appearance [Oropharynx] : the oropharynx was normal [Neck Appearance] : the appearance of the neck was normal [Neck Cervical Mass (___cm)] : no neck mass was observed [Jugular Venous Distention Increased] : there was no jugular-venous distention [Thyroid Diffuse Enlargement] : the thyroid was not enlarged [Thyroid Nodule] : there were no palpable thyroid nodules [Auscultation Breath Sounds / Voice Sounds] : lungs were clear to auscultation bilaterally [Heart Rate And Rhythm] : heart rate was normal and rhythm regular [Heart Sounds] : normal S1 and S2 [Heart Sounds Gallop] : no gallops [Murmurs] : no murmurs [Heart Sounds Pericardial Friction Rub] : no pericardial rub [Bowel Sounds] : normal bowel sounds [Abdomen Soft] : soft [Abdomen Tenderness] : non-tender [] : no hepato-splenomegaly [Abdomen Mass (___ Cm)] : no abdominal mass palpated [Normal Sphincter Tone] : normal sphincter tone [No Rectal Mass] : no rectal mass [No CVA Tenderness] : no ~M costovertebral angle tenderness [Abnormal Walk] : normal gait [Musculoskeletal - Swelling] : no joint swelling seen [Skin Color & Pigmentation] : normal skin color and pigmentation [Skin Turgor] : normal skin turgor [Oriented To Time, Place, And Person] : oriented to person, place, and time [Internal Hemorrhoid] : no internal hemorrhoids [External Hemorrhoid] : no external hemorrhoids [Occult Blood Positive] : stool was negative for occult blood [FreeTextEntry1] : Perirectal inflammation

## 2021-07-08 NOTE — HISTORY OF PRESENT ILLNESS
[None] : had no significant interval events [Heartburn] : denies heartburn [Nausea] : denies nausea [Vomiting] : denies vomiting [Diarrhea] : denies diarrhea [Constipation] : denies constipation [Yellow Skin Or Eyes (Jaundice)] : denies jaundice [Abdominal Pain] : denies abdominal pain [Abdominal Swelling] : denies abdominal swelling [Rectal Pain] : stable rectal pain [Wt Gain ___ Lbs] : no recent weight gain [Wt Loss ___ Lbs] : no recent weight loss [GERD] : no gastroesophageal reflux disease [Hiatus Hernia] : no hiatus hernia [Peptic Ulcer Disease] : no peptic ulcer disease [Pancreatitis] : no pancreatitis [Cholelithiasis] : no cholelithiasis [Kidney Stone] : no kidney stone [Inflammatory Bowel Disease] : no inflammatory bowel disease [Irritable Bowel Syndrome] : no irritable bowel syndrome [Diverticulitis] : no diverticulitis [Alcohol Abuse] : no alcohol abuse [Malignancy] : no malignancy [Abdominal Surgery] : no abdominal surgery [Appendectomy] : no appendectomy [Cholecystectomy] : no cholecystectomy [de-identified] : May 2021 [de-identified] : Negative colonoscopy [de-identified] : Patient presents for follow-up evaluation status post a negative colonoscopy except for sigmoid diverticulosis done in May 2021 for screening purposes.  He presents with rectal pain and perirectal discomfort.  No gross hematochezia or melena or change in bowel habits.

## 2021-08-15 NOTE — ED PROVIDER NOTE - WORK/EXCUSE FORM DATE
Pre-Procedure Patient Identification:  I am the Primary Anesthesiologist and have identified the patient on 08/15/21 at 6:21 PM.   I have confirmed the procedure(s) will be performed by the following surgeon/proceduralist Anastacio Colon MD.  
05-Sep-2020

## 2022-01-03 NOTE — ED ADULT NURSE NOTE - NSIMPLEMENTINTERV_GEN_ALL_ED
Patient calling back to provide [facility] outpatient lab fax number. Please fax to [phone]. Patient was advised message will be sent. Implemented All Universal Safety Interventions:  [place] to call system. Call bell, personal items and telephone within reach. Instruct patient to call for assistance. Room bathroom lighting operational. Non-slip footwear when patient is off stretcher. Physically safe environment: no spills, clutter or unnecessary equipment. Stretcher in lowest position, wheels locked, appropriate side rails in place.

## 2022-05-19 NOTE — ED ADULT NURSE NOTE - PERIPHERAL VASCULAR
WDL Rhofade Counseling: Rhofade is a topical medication which can decrease superficial blood flow where applied. Side effects are uncommon and include stinging, redness and allergic reactions.

## 2022-10-17 ENCOUNTER — EMERGENCY (EMERGENCY)
Facility: HOSPITAL | Age: 57
LOS: 1 days | Discharge: HOME | End: 2022-10-17
Attending: EMERGENCY MEDICINE
Payer: MEDICAID

## 2022-10-17 VITALS
SYSTOLIC BLOOD PRESSURE: 154 MMHG | OXYGEN SATURATION: 97 % | HEIGHT: 64 IN | DIASTOLIC BLOOD PRESSURE: 91 MMHG | RESPIRATION RATE: 18 BRPM | HEART RATE: 74 BPM | TEMPERATURE: 98 F

## 2022-10-17 VITALS
OXYGEN SATURATION: 99 % | SYSTOLIC BLOOD PRESSURE: 148 MMHG | HEART RATE: 75 BPM | RESPIRATION RATE: 18 BRPM | TEMPERATURE: 98 F | DIASTOLIC BLOOD PRESSURE: 62 MMHG

## 2022-10-17 DIAGNOSIS — Z98.89 OTHER SPECIFIED POSTPROCEDURAL STATES: Chronic | ICD-10-CM

## 2022-10-17 DIAGNOSIS — Z90.49 ACQUIRED ABSENCE OF OTHER SPECIFIED PARTS OF DIGESTIVE TRACT: Chronic | ICD-10-CM

## 2022-10-17 PROBLEM — I10 ESSENTIAL (PRIMARY) HYPERTENSION: Chronic | Status: ACTIVE | Noted: 2021-06-14

## 2022-10-17 PROCEDURE — 96372 THER/PROPH/DIAG INJ SC/IM: CPT

## 2022-10-17 PROCEDURE — 99283 EMERGENCY DEPT VISIT LOW MDM: CPT

## 2022-10-17 PROCEDURE — 93010 ELECTROCARDIOGRAM REPORT: CPT

## 2022-10-17 PROCEDURE — 93005 ELECTROCARDIOGRAM TRACING: CPT

## 2022-10-17 PROCEDURE — 71045 X-RAY EXAM CHEST 1 VIEW: CPT | Mod: 26

## 2022-10-17 PROCEDURE — 71045 X-RAY EXAM CHEST 1 VIEW: CPT

## 2022-10-17 PROCEDURE — 99284 EMERGENCY DEPT VISIT MOD MDM: CPT

## 2022-10-17 RX ORDER — ACETAMINOPHEN 500 MG
650 TABLET ORAL ONCE
Refills: 0 | Status: COMPLETED | OUTPATIENT
Start: 2022-10-17 | End: 2022-10-17

## 2022-10-17 RX ORDER — SUMATRIPTAN SUCCINATE 4 MG/.5ML
1 INJECTION, SOLUTION SUBCUTANEOUS
Qty: 30 | Refills: 0
Start: 2022-10-17

## 2022-10-17 RX ORDER — SUMATRIPTAN SUCCINATE 4 MG/.5ML
50 INJECTION, SOLUTION SUBCUTANEOUS ONCE
Refills: 0 | Status: COMPLETED | OUTPATIENT
Start: 2022-10-17 | End: 2022-10-17

## 2022-10-17 RX ORDER — KETOROLAC TROMETHAMINE 30 MG/ML
30 SYRINGE (ML) INJECTION ONCE
Refills: 0 | Status: DISCONTINUED | OUTPATIENT
Start: 2022-10-17 | End: 2022-10-17

## 2022-10-17 RX ADMIN — SUMATRIPTAN SUCCINATE 50 MILLIGRAM(S): 4 INJECTION, SOLUTION SUBCUTANEOUS at 09:43

## 2022-10-17 RX ADMIN — Medication 30 MILLIGRAM(S): at 07:43

## 2022-10-17 RX ADMIN — Medication 650 MILLIGRAM(S): at 09:43

## 2022-10-17 RX ADMIN — Medication 650 MILLIGRAM(S): at 07:43

## 2022-10-17 RX ADMIN — SUMATRIPTAN SUCCINATE 50 MILLIGRAM(S): 4 INJECTION, SOLUTION SUBCUTANEOUS at 07:44

## 2022-10-17 RX ADMIN — Medication 30 MILLIGRAM(S): at 09:43

## 2022-10-17 NOTE — ED PROVIDER NOTE - NSICDXPASTMEDICALHX_GEN_ALL_CORE_FT
PAST MEDICAL HISTORY:  HTN (hypertension)     No pertinent past medical history     No pertinent past medical history

## 2022-10-17 NOTE — ED PROVIDER NOTE - PATIENT PORTAL LINK FT
You can access the FollowMyHealth Patient Portal offered by [facility] by registering at the following website: [email or web address]. By joining [employer]’s FollowMyHealth portal, you will also be able to view your health information using other applications (apps) compatible with our system.

## 2022-10-17 NOTE — ED PROVIDER NOTE - NSICDXPASTSURGICALHX_GEN_ALL_CORE_FT
PAST SURGICAL HISTORY:  History of cholecystectomy     No significant past surgical history     S/P hernia surgery     S/P shoulder surgery
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
## 2022-10-17 NOTE — ED PROVIDER NOTE - CARE PROVIDER_API CALL
Charanjit Gee; PhD)  Neurology; Vascular Neurology  [address] [place] [address]  Phone: [phone]  Fax: [phone]  Follow Up Time:

## 2022-10-17 NOTE — ED ADULT NURSE NOTE - NS ED NURSE LEVEL OF CONSCIOUSNESS AFFECT
[FreeTextEntry1] : thoracolumbar scoliosis\par Toe pain bilateral resolved.\par \par The history for today's visit was obtained from the child, as well as the parent. The child's history was unreliable alone due to age and therefore, the parent was used today as an independent historian.\par Xrays today of the spine ap and lateral reveal approx 16 degree curve. Mild increase in kyphosis thoracic region to approx 52 degrees. No wedging. Small LLD noted. \par This was discussed at length with mother and patient. She has reached skeletal maturity and this curve should not progress in the future. No further f/u is needed. SHe has a mild increase in postural kyphosis and working on core and postural strengthening recommended. \par As far as the toe pain she experienced, it is most likely a tendonitis due to the poor fitting shoe wear. It is recommended that she use more of a sneaker when walking in the city or more supportive shoe wear. \par SHe will f/u with us on a PRN basis. \par \par Sera DE GUZMAN, MPAS, PAC have acted as scribe and documented the above for Dr. Carlos. \par The above documentation completed by the scribe is an accurate record of both my words and actions.  JPD\par \par \par 
Calm

## 2022-11-26 ENCOUNTER — EMERGENCY (EMERGENCY)
Facility: HOSPITAL | Age: 57
LOS: 1 days | Discharge: HOME | End: 2022-11-26
Attending: EMERGENCY MEDICINE
Payer: MEDICAID

## 2022-11-26 VITALS
TEMPERATURE: 98 F | OXYGEN SATURATION: 100 % | DIASTOLIC BLOOD PRESSURE: 101 MMHG | RESPIRATION RATE: 18 BRPM | SYSTOLIC BLOOD PRESSURE: 181 MMHG | HEIGHT: 64 IN | HEART RATE: 78 BPM

## 2022-11-26 VITALS
SYSTOLIC BLOOD PRESSURE: 166 MMHG | HEART RATE: 75 BPM | DIASTOLIC BLOOD PRESSURE: 93 MMHG | RESPIRATION RATE: 19 BRPM | OXYGEN SATURATION: 97 %

## 2022-11-26 DIAGNOSIS — Z90.49 ACQUIRED ABSENCE OF OTHER SPECIFIED PARTS OF DIGESTIVE TRACT: Chronic | ICD-10-CM

## 2022-11-26 DIAGNOSIS — Z98.89 OTHER SPECIFIED POSTPROCEDURAL STATES: Chronic | ICD-10-CM

## 2022-11-26 LAB
ALBUMIN SERPL ELPH-MCNC: 4.2 G/DL — SIGNIFICANT CHANGE UP (ref 3.3–5.2)
ALP SERPL-CCNC: 91 U/L — SIGNIFICANT CHANGE UP (ref 40–120)
ALT FLD-CCNC: 35 U/L — SIGNIFICANT CHANGE UP
ANION GAP SERPL CALC-SCNC: 13 MMOL/L — SIGNIFICANT CHANGE UP (ref 5–17)
AST SERPL-CCNC: 49 U/L — HIGH
BASOPHILS # BLD AUTO: 0.04 K/UL — SIGNIFICANT CHANGE UP (ref 0–0.2)
BASOPHILS NFR BLD AUTO: 0.5 % — SIGNIFICANT CHANGE UP (ref 0–2)
BILIRUB SERPL-MCNC: 0.4 MG/DL — SIGNIFICANT CHANGE UP (ref 0.4–2)
BUN SERPL-MCNC: 15.3 MG/DL — SIGNIFICANT CHANGE UP (ref 8–20)
CALCIUM SERPL-MCNC: 9.2 MG/DL — SIGNIFICANT CHANGE UP (ref 8.4–10.5)
CHLORIDE SERPL-SCNC: 105 MMOL/L — SIGNIFICANT CHANGE UP (ref 96–108)
CO2 SERPL-SCNC: 25 MMOL/L — SIGNIFICANT CHANGE UP (ref 22–29)
CREAT SERPL-MCNC: 0.89 MG/DL — SIGNIFICANT CHANGE UP (ref 0.5–1.3)
EGFR: 100 ML/MIN/1.73M2 — SIGNIFICANT CHANGE UP
EOSINOPHIL # BLD AUTO: 0.87 K/UL — HIGH (ref 0–0.5)
EOSINOPHIL NFR BLD AUTO: 9.8 % — HIGH (ref 0–6)
GLUCOSE SERPL-MCNC: 124 MG/DL — HIGH (ref 70–99)
HCT VFR BLD CALC: 49.8 % — SIGNIFICANT CHANGE UP (ref 39–50)
HGB BLD-MCNC: 16.8 G/DL — SIGNIFICANT CHANGE UP (ref 13–17)
IMM GRANULOCYTES NFR BLD AUTO: 0.3 % — SIGNIFICANT CHANGE UP (ref 0–0.9)
LIDOCAIN IGE QN: 44 U/L — SIGNIFICANT CHANGE UP (ref 22–51)
LYMPHOCYTES # BLD AUTO: 2.77 K/UL — SIGNIFICANT CHANGE UP (ref 1–3.3)
LYMPHOCYTES # BLD AUTO: 31.3 % — SIGNIFICANT CHANGE UP (ref 13–44)
MCHC RBC-ENTMCNC: 29.7 PG — SIGNIFICANT CHANGE UP (ref 27–34)
MCHC RBC-ENTMCNC: 33.7 GM/DL — SIGNIFICANT CHANGE UP (ref 32–36)
MCV RBC AUTO: 88 FL — SIGNIFICANT CHANGE UP (ref 80–100)
MONOCYTES # BLD AUTO: 0.52 K/UL — SIGNIFICANT CHANGE UP (ref 0–0.9)
MONOCYTES NFR BLD AUTO: 5.9 % — SIGNIFICANT CHANGE UP (ref 2–14)
NEUTROPHILS # BLD AUTO: 4.62 K/UL — SIGNIFICANT CHANGE UP (ref 1.8–7.4)
NEUTROPHILS NFR BLD AUTO: 52.2 % — SIGNIFICANT CHANGE UP (ref 43–77)
PLATELET # BLD AUTO: 261 K/UL — SIGNIFICANT CHANGE UP (ref 150–400)
POTASSIUM SERPL-MCNC: 4.5 MMOL/L — SIGNIFICANT CHANGE UP (ref 3.5–5.3)
POTASSIUM SERPL-SCNC: 4.5 MMOL/L — SIGNIFICANT CHANGE UP (ref 3.5–5.3)
PROT SERPL-MCNC: 7.6 G/DL — SIGNIFICANT CHANGE UP (ref 6.6–8.7)
RBC # BLD: 5.66 M/UL — SIGNIFICANT CHANGE UP (ref 4.2–5.8)
RBC # FLD: 13 % — SIGNIFICANT CHANGE UP (ref 10.3–14.5)
SODIUM SERPL-SCNC: 143 MMOL/L — SIGNIFICANT CHANGE UP (ref 135–145)
WBC # BLD: 8.85 K/UL — SIGNIFICANT CHANGE UP (ref 3.8–10.5)
WBC # FLD AUTO: 8.85 K/UL — SIGNIFICANT CHANGE UP (ref 3.8–10.5)

## 2022-11-26 PROCEDURE — 99285 EMERGENCY DEPT VISIT HI MDM: CPT

## 2022-11-26 PROCEDURE — 80053 COMPREHEN METABOLIC PANEL: CPT

## 2022-11-26 PROCEDURE — 74177 CT ABD & PELVIS W/CONTRAST: CPT | Mod: MA

## 2022-11-26 PROCEDURE — 74177 CT ABD & PELVIS W/CONTRAST: CPT | Mod: 26,MA

## 2022-11-26 PROCEDURE — 83690 ASSAY OF LIPASE: CPT

## 2022-11-26 PROCEDURE — 99284 EMERGENCY DEPT VISIT MOD MDM: CPT

## 2022-11-26 PROCEDURE — 36415 COLL VENOUS BLD VENIPUNCTURE: CPT

## 2022-11-26 PROCEDURE — 85025 COMPLETE CBC W/AUTO DIFF WBC: CPT

## 2022-11-26 RX ORDER — ACETAMINOPHEN 500 MG
650 TABLET ORAL ONCE
Refills: 0 | Status: COMPLETED | OUTPATIENT
Start: 2022-11-26 | End: 2022-11-26

## 2022-11-26 RX ADMIN — Medication 1 ENEMA: at 04:03

## 2022-11-26 RX ADMIN — Medication 650 MILLIGRAM(S): at 05:51

## 2022-11-26 RX ADMIN — Medication 650 MILLIGRAM(S): at 04:38

## 2022-11-26 NOTE — ED PROVIDER NOTE - PATIENT PORTAL LINK FT
You can access the FollowMyHealth Patient Portal offered by [facility] by registering at the following website: [email or web address]. By joining [employer]’s FollowMyHealth portal, you will also be able to view your health information using other applications (apps) compatible with our system.

## 2022-11-26 NOTE — ED PROVIDER NOTE - NS ED ATTENDING STATEMENT MOD
This was a shared visit with the BRITTANI. I reviewed and verified the documentation and independently performed the documented:

## 2022-11-26 NOTE — ED PROVIDER NOTE - NSDCPRINTRESULTS_ED_ALL_ED
Patient requests all Lab, Cardiology, and Radiology Results on their Discharge Instructions
09-Oct-2019 10:14
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
## 2022-11-26 NOTE — ED PROVIDER NOTE - CLINICAL SUMMARY MEDICAL DECISION MAKING FREE TEXT BOX
58 y/o male with hx of HTN present sot the ED c/o abdominal pain. pt notes he was unable to go to the bathroom today due to not having a bathroom. Abdomen soft, non distended, will give enema and reassess
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
## 2022-11-29 ENCOUNTER — OFFICE (OUTPATIENT)
Dept: URBAN - METROPOLITAN AREA CLINIC 94 | Facility: CLINIC | Age: 57
Setting detail: OPHTHALMOLOGY
End: 2022-11-29
Payer: MEDICAID

## 2022-11-29 DIAGNOSIS — H01.014: ICD-10-CM

## 2022-11-29 DIAGNOSIS — H02.012: ICD-10-CM

## 2022-11-29 DIAGNOSIS — H04.123: ICD-10-CM

## 2022-11-29 DIAGNOSIS — H25.13: ICD-10-CM

## 2022-11-29 PROBLEM — H02.052 TRICHIASIS, WITHOUT ENTROPION; RIGHT LOWER LID: Status: ACTIVE | Noted: 2022-11-29

## 2022-11-29 PROCEDURE — 67820 REVISE EYELASHES: CPT | Performed by: OPHTHALMOLOGY

## 2022-11-29 PROCEDURE — 83861 MICROFLUID ANALY TEARS: CPT | Performed by: OPHTHALMOLOGY

## 2022-11-29 PROCEDURE — 92012 INTRM OPH EXAM EST PATIENT: CPT | Performed by: OPHTHALMOLOGY

## 2022-11-29 ASSESSMENT — KERATOMETRY
OD_AXISANGLE_DEGREES: 108
OS_K2POWER_DIOPTERS: 45.00
OD_K1POWER_DIOPTERS: 44.75
OS_K1POWER_DIOPTERS: 44.75
METHOD_AUTO_MANUAL: AUTO
OD_K2POWER_DIOPTERS: 45.25
OS_AXISANGLE_DEGREES: 076

## 2022-11-29 ASSESSMENT — REFRACTION_CURRENTRX
OD_ADD: +1.50
OD_VPRISM_DIRECTION: BF
OS_CYLINDER: -0.50
OS_OVR_VA: 20/
OS_AXIS: 126
OD_CYLINDER: -0.50
OS_SPHERE: +1.25
OS_VPRISM_DIRECTION: BF
OD_AXIS: 056
OS_ADD: +1.50
OD_OVR_VA: 20/
OD_SPHERE: +1.00

## 2022-11-29 ASSESSMENT — AXIALLENGTH_DERIVED
OS_AL: 22.6848
OD_AL: 22.9148
OD_AL: 22.869
OD_AL: 22.6874
OS_AL: 22.7299
OS_AL: 17.04
OD_AL: 22.9609
OS_AL: 22.5505
OD_AL: 16.96

## 2022-11-29 ASSESSMENT — REFRACTION_MANIFEST
OS_AXIS: 160
OD_CYLINDER: -0.50
OD_AXIS: 050
OS_ADD: +1.50
OD_SPHERE: +0.50
OD_ADD: +1.50
OD_AXIS: 030
OD_ADD: +2.00
OS_SPHERE: +1.00
OS_CYLINDER: SPH
OD_SPHERE: +0.50
OU_VA: 20/30
OS_CYLINDER: SPH
OS_VA1: 20/30
OD_CYLINDER: SPH
OD_SPHERE: +0.75
OS_ADD: +2.00
OS_ADD: +2.00
OD_VA1: 20/30
OS_SPHERE: +1.00
OS_VA2: 20/20
OS_VA1: 20/30
OS_SPHERE: +1.25
OS_VA2: 20/20
OD_VA2: 20/20
OS_CYLINDER: -0.50
OD_VA2: 20/20
OS_VA1: 20/25
OD_ADD: +2.00
OU_VA: 20/25
OD_VA1: 20/25
OD_CYLINDER: -0.50
OD_VA1: 20/30

## 2022-11-29 ASSESSMENT — CONFRONTATIONAL VISUAL FIELD TEST (CVF)
OD_FINDINGS: FULL
OS_FINDINGS: FULL

## 2022-11-29 ASSESSMENT — SPHEQUIV_DERIVED
OS_SPHEQUIV: 1.5
OD_SPHEQUIV: 0.25
OD_SPHEQUIV: 22.25
OD_SPHEQUIV: 1
OS_SPHEQUIV: 22
OS_SPHEQUIV: 1.125
OD_SPHEQUIV: 0.375
OD_SPHEQUIV: 0.5
OS_SPHEQUIV: 1

## 2022-11-29 ASSESSMENT — SUPERFICIAL PUNCTATE KERATITIS (SPK)
OD_SPK: 2+
OS_SPK: 2+

## 2022-11-29 ASSESSMENT — VISUAL ACUITY
OD_BCVA: 20/40-1
OS_BCVA: 20/20-1

## 2022-11-29 ASSESSMENT — REFRACTION_AUTOREFRACTION
OD_AXIS: 046
OS_AXIS: 108
OS_CYLINDER: -0.50
OD_CYLINDER: -0.50
OS_SPHERE: +1.75
OD_SPHERE: +1.25

## 2022-11-29 ASSESSMENT — REFRACTION_RETINOSCOPY
OD_CYLINDER: -0.75
OD_SPHERE: +0.75
OS_SPHERE: +1.25
OS_CYLINDER: -0.25
OS_AXIS: 150
OD_AXIS: 080

## 2022-11-29 ASSESSMENT — LID EXAM ASSESSMENTS: OD_TRICHIASIS: RLL

## 2023-03-01 NOTE — ED PROVIDER NOTE - MEDICAL DECISION MAKING DETAILS
Awaiting palliative consult cm will cont to monitor for dc needs   The patient presents with atypical chest pain and has two trop negative and normal CXR and normal EKG and will follow up with PMD

## 2023-03-15 ENCOUNTER — OFFICE (OUTPATIENT)
Dept: URBAN - METROPOLITAN AREA CLINIC 94 | Facility: CLINIC | Age: 58
Setting detail: OPHTHALMOLOGY
End: 2023-03-15
Payer: MEDICAID

## 2023-03-15 DIAGNOSIS — H43.393: ICD-10-CM

## 2023-03-15 DIAGNOSIS — H25.11: ICD-10-CM

## 2023-03-15 DIAGNOSIS — H25.13: ICD-10-CM

## 2023-03-15 PROCEDURE — 99214 OFFICE O/P EST MOD 30 MIN: CPT | Performed by: OPHTHALMOLOGY

## 2023-03-15 PROCEDURE — 92136 OPHTHALMIC BIOMETRY: CPT | Performed by: OPHTHALMOLOGY

## 2023-03-15 PROCEDURE — 92250 FUNDUS PHOTOGRAPHY W/I&R: CPT | Performed by: OPHTHALMOLOGY

## 2023-03-15 ASSESSMENT — REFRACTION_CURRENTRX
OS_ADD: +1.50
OS_OVR_VA: 20/
OD_SPHERE: +1.00
OD_ADD: +1.50
OD_AXIS: 056
OS_VPRISM_DIRECTION: BF
OD_CYLINDER: -0.50
OS_CYLINDER: -0.50
OS_AXIS: 126
OD_OVR_VA: 20/
OD_VPRISM_DIRECTION: BF
OS_SPHERE: +1.25

## 2023-03-15 ASSESSMENT — REFRACTION_MANIFEST
OS_CYLINDER: -0.50
OS_SPHERE: +1.00
OD_SPHERE: +0.50
OD_VA1: 20/30
OS_VA1: 20/30
OS_SPHERE: +1.00
OU_VA: 20/30
OS_VA2: 20/20
OD_AXIS: 050
OS_VA1: 20/25
OS_CYLINDER: SPH
OS_SPHERE: +1.75
OS_CYLINDER: SPH
OD_VA1: 20/50
OD_AXIS: 044
OD_ADD: +2.00
OS_ADD: +2.00
OD_ADD: +1.50
OD_CYLINDER: -0.50
OS_ADD: +2.00
OD_SPHERE: +0.75
OD_VA2: 20/20
OS_VA1: 20/30
OS_VA2: 20/20
OD_CYLINDER: SPH
OD_VA2: 20/20
OD_SPHERE: +0.50
OD_SPHERE: +1.00
OD_CYLINDER: -0.50
OD_VA1: 20/25
OU_VA: 20/25
OS_ADD: +1.50
OD_VA1: 20/30
OS_AXIS: 121
OS_VA1: 20/60
OS_CYLINDER: -0.50
OS_AXIS: 160
OD_AXIS: 030
OS_SPHERE: +1.25
OD_ADD: +2.00
OD_CYLINDER: -0.50

## 2023-03-15 ASSESSMENT — REFRACTION_AUTOREFRACTION
OD_AXIS: 044
OD_SPHERE: +1.00
OS_SPHERE: +1.75
OD_CYLINDER: -0.50
OS_CYLINDER: -0.50
OS_AXIS: 121

## 2023-03-15 ASSESSMENT — TONOMETRY
OD_IOP_MMHG: 12
OS_IOP_MMHG: 12

## 2023-03-15 ASSESSMENT — AXIALLENGTH_DERIVED
OD_AL: 22.8207
OD_AL: 16.99
OS_AL: 22.6424
OS_AL: 22.6874
OD_AL: 22.8207
OS_AL: 22.5087
OD_AL: 23.0045
OD_AL: 22.9122
OS_AL: 17.01
OS_AL: 22.5087
OD_AL: 22.9582

## 2023-03-15 ASSESSMENT — SPHEQUIV_DERIVED
OS_SPHEQUIV: 1
OS_SPHEQUIV: 1.5
OS_SPHEQUIV: 22
OS_SPHEQUIV: 1.125
OD_SPHEQUIV: 0.25
OD_SPHEQUIV: 0.75
OS_SPHEQUIV: 1.5
OD_SPHEQUIV: 0.75
OD_SPHEQUIV: 22.25
OD_SPHEQUIV: 0.5
OD_SPHEQUIV: 0.375

## 2023-03-15 ASSESSMENT — CONFRONTATIONAL VISUAL FIELD TEST (CVF)
OD_FINDINGS: FULL
OS_FINDINGS: FULL

## 2023-03-15 ASSESSMENT — KERATOMETRY
OD_K1POWER_DIOPTERS: 44.50
OD_AXISANGLE2_DEGREES: 099
OS_K1K2_AVERAGE: 45
OS_CYLAXISANGLE_DEGREES: 180
OS_K1POWER_DIOPTERS: 45.00
OS_AXISANGLE_DEGREES: 090
OD_CYLAXISANGLE_DEGREES: 099
OS_AXISANGLE_DEGREES: 180
OD_CYLPOWER_DEGREES: 0.75
OD_K1K2_AVERAGE: 44.875
OD_AXISANGLE_DEGREES: 099
METHOD_AUTO_MANUAL: AUTO
OD_K2POWER_DIOPTERS: 45.25
OD_AXISANGLE_DEGREES: 9
OS_K2POWER_DIOPTERS: 45.00
OD_K2POWER_DIOPTERS: 45.25
OS_K1POWER_DIOPTERS: 45.00
OD_K1POWER_DIOPTERS: 44.50
OS_AXISANGLE2_DEGREES: 090
OS_K2POWER_DIOPTERS: 45.00

## 2023-03-15 ASSESSMENT — REFRACTION_RETINOSCOPY
OD_SPHERE: +0.75
OS_SPHERE: +1.25
OD_AXIS: 080
OD_CYLINDER: -0.75
OS_AXIS: 150
OS_CYLINDER: -0.25

## 2023-03-15 ASSESSMENT — LID EXAM ASSESSMENTS: OD_TRICHIASIS: RLL

## 2023-03-15 ASSESSMENT — VISUAL ACUITY
OD_BCVA: 20/60+
OS_BCVA: 20/50+

## 2023-03-15 ASSESSMENT — SUPERFICIAL PUNCTATE KERATITIS (SPK)
OS_SPK: 2+
OD_SPK: 2+

## 2023-03-28 NOTE — ED PROVIDER NOTE - PHYSICAL EXAMINATION
Const: Awake, alert and oriented. In no acute distress. Well appearing.  HEENT: NC/AT. Moist mucous membranes.  Eyes: No scleral icterus. EOMI.  Neck:. Soft and supple. Full ROM without pain.  Cardiac: Regular rate and regular rhythm. +S1/S2. Peripheral pulses 2+ and symmetric. No LE edema.  Resp: Speaking in full sentences. No evidence of respiratory distress. No wheezes, rales or rhonchi.  Abd: Soft, non-distended. Right upper and lower abdominal tenderness to palpation. Normal bowel sounds in all 4 quadrants. No guarding or rebound.  Back: Spine midline and non-tender. Right sided CVA tenderness.   : Testicles non-enlarged. No testicular tenderness to palpation. No induration noted either testicle.   Skin: No rashes, abrasions or lacerations.  Lymph: No cervical lymphadenopathy.  Neuro: Awake, alert & oriented x 3. Moves all extremities symmetrically. Alert and oriented to person, place and time

## 2023-04-05 ENCOUNTER — ASC (OUTPATIENT)
Dept: URBAN - METROPOLITAN AREA SURGERY 8 | Facility: SURGERY | Age: 58
Setting detail: OPHTHALMOLOGY
End: 2023-04-05
Payer: MEDICAID

## 2023-04-05 DIAGNOSIS — H25.12: ICD-10-CM

## 2023-04-05 PROCEDURE — 66984 XCAPSL CTRC RMVL W/O ECP: CPT | Performed by: OPHTHALMOLOGY

## 2023-04-06 ENCOUNTER — OFFICE (OUTPATIENT)
Dept: URBAN - METROPOLITAN AREA CLINIC 94 | Facility: CLINIC | Age: 58
Setting detail: OPHTHALMOLOGY
End: 2023-04-06
Payer: MEDICAID

## 2023-04-06 ENCOUNTER — RX ONLY (RX ONLY)
Age: 58
End: 2023-04-06

## 2023-04-06 DIAGNOSIS — Z96.89: ICD-10-CM

## 2023-04-06 PROBLEM — H25.12 CATARACT SENILE NUCLEAR SCLEROSIS; RIGHT EYE, LEFT EYE, BOTH EYES: Status: ACTIVE | Noted: 2023-03-15

## 2023-04-06 PROBLEM — H25.13 CATARACT SENILE NUCLEAR SCLEROSIS; RIGHT EYE, LEFT EYE, BOTH EYES: Status: ACTIVE | Noted: 2023-03-15

## 2023-04-06 PROBLEM — H25.11 CATARACT SENILE NUCLEAR SCLEROSIS; RIGHT EYE, LEFT EYE, BOTH EYES: Status: ACTIVE | Noted: 2023-03-15

## 2023-04-06 PROCEDURE — 99024 POSTOP FOLLOW-UP VISIT: CPT | Performed by: OPHTHALMOLOGY

## 2023-04-06 ASSESSMENT — REFRACTION_CURRENTRX
OS_OVR_VA: 20/
OS_CYLINDER: -0.50
OD_CYLINDER: -0.50
OS_SPHERE: +1.25
OD_AXIS: 056
OD_ADD: +1.50
OD_VPRISM_DIRECTION: BF
OS_ADD: +1.50
OD_SPHERE: +1.00
OS_VPRISM_DIRECTION: BF
OD_OVR_VA: 20/
OS_AXIS: 126

## 2023-04-06 ASSESSMENT — REFRACTION_MANIFEST
OS_ADD: +2.00
OD_AXIS: 050
OD_VA1: 20/30
OS_CYLINDER: SPH
OS_VA2: 20/20
OD_SPHERE: +0.75
OS_VA1: 20/25
OD_CYLINDER: -0.50
OD_SPHERE: +0.50
OS_AXIS: 121
OS_SPHERE: +1.75
OS_SPHERE: +1.00
OS_VA1: 20/30
OD_CYLINDER: SPH
OD_VA1: 20/25
OS_VA1: 20/30
OS_CYLINDER: SPH
OS_AXIS: 160
OD_ADD: +2.00
OD_VA1: 20/50
OS_CYLINDER: -0.50
OS_SPHERE: +1.00
OD_ADD: +1.50
OD_VA1: 20/30
OD_AXIS: 044
OD_AXIS: 030
OU_VA: 20/25
OD_ADD: +2.00
OU_VA: 20/30
OD_VA2: 20/20
OS_ADD: +2.00
OS_ADD: +1.50
OS_SPHERE: +1.25
OD_SPHERE: +1.00
OD_VA2: 20/20
OS_VA1: 20/60
OD_SPHERE: +0.50
OS_CYLINDER: -0.50
OS_VA2: 20/20
OD_CYLINDER: -0.50
OD_CYLINDER: -0.50

## 2023-04-06 ASSESSMENT — SPHEQUIV_DERIVED
OS_SPHEQUIV: 1.125
OD_SPHEQUIV: 0.25
OD_SPHEQUIV: 0.25
OS_SPHEQUIV: 0.375
OD_SPHEQUIV: 0.75
OS_SPHEQUIV: 22
OS_SPHEQUIV: 1
OD_SPHEQUIV: 0.5
OD_SPHEQUIV: 0.375
OD_SPHEQUIV: 22.25
OS_SPHEQUIV: 1.5

## 2023-04-06 ASSESSMENT — AXIALLENGTH_DERIVED
OD_AL: 22.8207
OD_AL: 23.0045
OD_AL: 23.0045
OD_AL: 22.9122
OD_AL: 16.99
OS_AL: 22.6874
OD_AL: 22.9582
OS_AL: 22.6424
OS_AL: 17.01
OS_AL: 22.9148
OS_AL: 22.5087

## 2023-04-06 ASSESSMENT — REFRACTION_AUTOREFRACTION
OS_AXIS: 004
OD_SPHERE: +0.75
OD_AXIS: 017
OS_CYLINDER: -0.25
OD_CYLINDER: -1.00
OS_SPHERE: +0.50

## 2023-04-06 ASSESSMENT — LID EXAM ASSESSMENTS: OD_TRICHIASIS: RLL

## 2023-04-06 ASSESSMENT — KERATOMETRY
OD_K1POWER_DIOPTERS: 44.50
METHOD_AUTO_MANUAL: AUTO
OD_K2POWER_DIOPTERS: 45.25
OD_AXISANGLE_DEGREES: 099
OS_AXISANGLE_DEGREES: 090
OS_K2POWER_DIOPTERS: 45.00
OS_K1POWER_DIOPTERS: 45.00

## 2023-04-06 ASSESSMENT — REFRACTION_RETINOSCOPY
OS_SPHERE: +1.25
OD_AXIS: 080
OS_AXIS: 150
OD_SPHERE: +0.75
OD_CYLINDER: -0.75
OS_CYLINDER: -0.25

## 2023-04-06 ASSESSMENT — SUPERFICIAL PUNCTATE KERATITIS (SPK)
OS_SPK: 2+
OD_SPK: 2+

## 2023-04-06 ASSESSMENT — VISUAL ACUITY
OD_BCVA: 20/60
OS_BCVA: 20/50+

## 2023-04-06 ASSESSMENT — TONOMETRY
OD_IOP_MMHG: 16
OS_IOP_MMHG: 17

## 2023-04-06 ASSESSMENT — CORNEAL EDEMA CLINICAL DESCRIPTION: OS_CORNEALEDEMA: T

## 2023-04-12 ENCOUNTER — OFFICE (OUTPATIENT)
Dept: URBAN - METROPOLITAN AREA CLINIC 94 | Facility: CLINIC | Age: 58
Setting detail: OPHTHALMOLOGY
End: 2023-04-12
Payer: MEDICAID

## 2023-04-12 DIAGNOSIS — Z96.1: ICD-10-CM

## 2023-04-12 DIAGNOSIS — H25.11: ICD-10-CM

## 2023-04-12 PROCEDURE — 99024 POSTOP FOLLOW-UP VISIT: CPT | Performed by: PHYSICIAN ASSISTANT

## 2023-04-12 ASSESSMENT — REFRACTION_MANIFEST
OD_SPHERE: +0.75
OD_CYLINDER: SPH
OD_CYLINDER: -0.50
OD_SPHERE: +0.50
OS_AXIS: 121
OS_AXIS: 160
OD_CYLINDER: -0.50
OS_ADD: +2.00
OD_AXIS: 050
OD_ADD: +1.50
OS_SPHERE: +1.00
OS_SPHERE: +1.00
OS_VA1: 20/30
OS_CYLINDER: -0.50
OU_VA: 20/30
OU_VA: 20/25
OS_VA1: 20/30
OD_ADD: +2.00
OD_VA1: 20/25
OS_CYLINDER: SPH
OS_VA1: 20/25
OS_SPHERE: +1.75
OS_CYLINDER: -0.50
OD_VA1: 20/30
OS_VA2: 20/20
OD_VA2: 20/20
OD_VA2: 20/20
OS_VA1: 20/60
OS_ADD: +1.50
OD_SPHERE: +0.50
OS_ADD: +2.00
OS_CYLINDER: SPH
OD_SPHERE: +1.00
OS_VA2: 20/20
OS_SPHERE: +1.25
OD_AXIS: 044
OD_ADD: +2.00
OD_AXIS: 030
OD_CYLINDER: -0.50
OD_VA1: 20/50
OD_VA1: 20/30

## 2023-04-12 ASSESSMENT — SPHEQUIV_DERIVED
OD_SPHEQUIV: 1
OS_SPHEQUIV: 22
OD_SPHEQUIV: 0.375
OS_SPHEQUIV: 0.375
OD_SPHEQUIV: 0.75
OD_SPHEQUIV: 0.5
OS_SPHEQUIV: 1.5
OS_SPHEQUIV: 1
OD_SPHEQUIV: 0.25
OS_SPHEQUIV: 1.125
OD_SPHEQUIV: 22.25

## 2023-04-12 ASSESSMENT — AXIALLENGTH_DERIVED
OD_AL: 22.783
OD_AL: 22.6028
OD_AL: 16.92
OS_AL: 16.92
OD_AL: 22.6926
OS_AL: 22.7429
OD_AL: 22.8285
OS_AL: 22.5188
OS_AL: 22.4745
OD_AL: 22.8742
OS_AL: 22.3427

## 2023-04-12 ASSESSMENT — REFRACTION_CURRENTRX
OS_ADD: +1.50
OD_VPRISM_DIRECTION: BF
OS_CYLINDER: -0.50
OD_CYLINDER: -0.50
OD_AXIS: 056
OD_OVR_VA: 20/
OS_AXIS: 126
OD_SPHERE: +1.00
OS_OVR_VA: 20/
OS_SPHERE: +1.25
OS_VPRISM_DIRECTION: BF
OD_ADD: +1.50

## 2023-04-12 ASSESSMENT — KERATOMETRY
METHOD_AUTO_MANUAL: AUTO
OD_K1POWER_DIOPTERS: 45.00
OD_K2POWER_DIOPTERS: 45.50
OS_AXISANGLE_DEGREES: 106
OD_AXISANGLE_DEGREES: 150
OS_K1POWER_DIOPTERS: 45.25
OS_K2POWER_DIOPTERS: 45.75

## 2023-04-12 ASSESSMENT — VISUAL ACUITY
OS_BCVA: 20/30-
OD_BCVA: 20/30

## 2023-04-12 ASSESSMENT — REFRACTION_RETINOSCOPY
OD_AXIS: 080
OD_CYLINDER: -0.75
OS_CYLINDER: -0.25
OS_SPHERE: +1.25
OD_SPHERE: +0.75
OS_AXIS: 150

## 2023-04-12 ASSESSMENT — REFRACTION_AUTOREFRACTION
OS_AXIS: 065
OS_SPHERE: +0.50
OD_CYLINDER: -0.50
OD_SPHERE: +1.25
OD_AXIS: 060
OS_CYLINDER: -0.25

## 2023-04-12 ASSESSMENT — CONFRONTATIONAL VISUAL FIELD TEST (CVF)
OD_FINDINGS: FULL
OS_FINDINGS: FULL

## 2023-04-12 ASSESSMENT — TONOMETRY
OD_IOP_MMHG: 16
OS_IOP_MMHG: 17

## 2023-04-12 ASSESSMENT — LID EXAM ASSESSMENTS: OD_TRICHIASIS: RLL

## 2023-04-12 ASSESSMENT — SUPERFICIAL PUNCTATE KERATITIS (SPK)
OS_SPK: 2+
OD_SPK: 2+

## 2023-04-19 ENCOUNTER — ASC (OUTPATIENT)
Dept: URBAN - METROPOLITAN AREA SURGERY 8 | Facility: SURGERY | Age: 58
Setting detail: OPHTHALMOLOGY
End: 2023-04-19
Payer: MEDICAID

## 2023-04-19 DIAGNOSIS — H25.12: ICD-10-CM

## 2023-04-19 PROCEDURE — 66984 XCAPSL CTRC RMVL W/O ECP: CPT | Performed by: OPHTHALMOLOGY

## 2023-04-20 ENCOUNTER — OFFICE (OUTPATIENT)
Dept: URBAN - METROPOLITAN AREA CLINIC 94 | Facility: CLINIC | Age: 58
Setting detail: OPHTHALMOLOGY
End: 2023-04-20
Payer: MEDICAID

## 2023-04-20 DIAGNOSIS — Z96.1: ICD-10-CM

## 2023-04-20 PROBLEM — H02.052 TRICHIASIS, WITHOUT ENTROPION; RIGHT LOWER LID: Status: ACTIVE | Noted: 2023-04-20

## 2023-04-20 PROCEDURE — 99024 POSTOP FOLLOW-UP VISIT: CPT | Performed by: OPTOMETRIST

## 2023-04-20 ASSESSMENT — REFRACTION_MANIFEST
OD_SPHERE: +0.50
OS_ADD: +1.50
OS_CYLINDER: SPH
OS_SPHERE: +1.25
OD_VA1: 20/25
OS_VA1: 20/30
OD_ADD: +2.00
OD_VA1: 20/30
OS_AXIS: 160
OS_SPHERE: +1.00
OD_SPHERE: +0.75
OD_CYLINDER: -0.50
OD_ADD: +2.00
OD_ADD: +1.50
OD_CYLINDER: -0.50
OD_AXIS: 030
OD_VA2: 20/20
OD_SPHERE: +0.50
OD_VA1: 20/50
OS_VA1: 20/30
OS_VA2: 20/20
OD_CYLINDER: SPH
OS_VA1: 20/60
OS_VA1: 20/25
OD_AXIS: 044
OS_VA2: 20/20
OD_VA2: 20/20
OS_CYLINDER: -0.50
OS_CYLINDER: -0.50
OS_ADD: +2.00
OS_AXIS: 121
OD_CYLINDER: -0.50
OU_VA: 20/30
OD_VA1: 20/30
OS_SPHERE: +1.75
OD_AXIS: 050
OS_ADD: +2.00
OS_SPHERE: +1.00
OS_CYLINDER: SPH
OU_VA: 20/25
OD_SPHERE: +1.00

## 2023-04-20 ASSESSMENT — REFRACTION_CURRENTRX
OS_SPHERE: +1.25
OS_AXIS: 126
OD_OVR_VA: 20/
OS_OVR_VA: 20/
OD_SPHERE: +1.00
OD_AXIS: 056
OS_ADD: +1.50
OS_VPRISM_DIRECTION: BF
OD_CYLINDER: -0.50
OD_VPRISM_DIRECTION: BF
OD_ADD: +1.50
OS_CYLINDER: -0.50

## 2023-04-20 ASSESSMENT — CONFRONTATIONAL VISUAL FIELD TEST (CVF)
OD_FINDINGS: FULL
OS_FINDINGS: FULL

## 2023-04-20 ASSESSMENT — SPHEQUIV_DERIVED
OD_SPHEQUIV: 0.25
OD_SPHEQUIV: 0.375
OD_SPHEQUIV: 0.75
OD_SPHEQUIV: 22.25
OD_SPHEQUIV: 0.875
OS_SPHEQUIV: 0.5
OS_SPHEQUIV: 1
OS_SPHEQUIV: 1.5
OS_SPHEQUIV: 1.125
OD_SPHEQUIV: 0.5
OS_SPHEQUIV: 22

## 2023-04-20 ASSESSMENT — AXIALLENGTH_DERIVED
OD_AL: 22.869
OD_AL: 22.7325
OS_AL: 17.06
OD_AL: 22.7778
OS_AL: 22.5926
OD_AL: 16.96
OS_AL: 22.7274
OD_AL: 22.9148
OS_AL: 22.7726
OS_AL: 22.9556
OD_AL: 22.9609

## 2023-04-20 ASSESSMENT — KERATOMETRY
OD_AXISANGLE_DEGREES: 100
OS_K2POWER_DIOPTERS: 44.75
OS_K1POWER_DIOPTERS: 44.75
OS_AXISANGLE_DEGREES: 090
METHOD_AUTO_MANUAL: AUTO
OD_K2POWER_DIOPTERS: 45.25
OD_K1POWER_DIOPTERS: 44.75

## 2023-04-20 ASSESSMENT — REFRACTION_AUTOREFRACTION
OD_AXIS: 010
OD_CYLINDER: -0.25
OS_AXIS: 065
OD_SPHERE: +1.00
OS_SPHERE: +0.75
OS_CYLINDER: -0.50

## 2023-04-20 ASSESSMENT — VISUAL ACUITY
OD_BCVA: 20/30
OS_BCVA: 20/30

## 2023-04-20 ASSESSMENT — SUPERFICIAL PUNCTATE KERATITIS (SPK)
OS_SPK: 2+
OD_SPK: 2+

## 2023-04-20 ASSESSMENT — REFRACTION_RETINOSCOPY
OS_CYLINDER: -0.25
OS_SPHERE: +1.25
OS_AXIS: 150
OD_AXIS: 080
OD_CYLINDER: -0.75
OD_SPHERE: +0.75

## 2023-04-20 ASSESSMENT — LID EXAM ASSESSMENTS: OD_TRICHIASIS: RLL

## 2023-04-20 ASSESSMENT — TONOMETRY
OS_IOP_MMHG: 17
OD_IOP_MMHG: 15

## 2023-04-21 ENCOUNTER — APPOINTMENT (OUTPATIENT)
Dept: UROLOGY | Facility: CLINIC | Age: 58
End: 2023-04-21

## 2023-05-17 NOTE — ED ADULT TRIAGE NOTE - HEIGHT IN INCHES
Pre-operative Diagnosis:  SI joint pain     Post-operative Diagnosis:  SI joint pain     Procedure: Bilateral SI joint injection     Procedure Description:  After having signed the informed consent, the patient was placed in the prone position. The patient's back was prepped with chloraprep solution, and draped in a sterile fashion. A total of 2 ml of 1% lidocaine were used to anesthetize the skin and underlying tissues. Under fluoroscopic guidance a single 22-gauge, 3.5 inch spinal needle was advanced to lie within the inferior pole of the RIGHT sacroiliac joint. There were no paresthesias or heme aspiration. Needle placement was confirmed in the AP view. After negative aspiration, 0.5 ml of Omnipaque 300 contrast was injected with appropriate spread observed. A total of 2 ml of 0.5% bupivicaine mixed with 40 mg depo-medrol were injected into the sacroiliac joint. The needle was withdrawn without any complications. This exact procedure was repeated on the contralateral side. The patient tolerated the procedure well, was transported to the recovery room and observed for 15 minutes and discharged in an ambulatory fashion. No immediate reported complications.     Procedural Complications: None  Estimated Blood Loss: 0 mL    IV sedation was used during the procedure:  - Moderate intravenous conscious sedation was supervised by Dr. Anthony Lemus  - The patient was independently monitored by a Registered Nurse assigned to the procedure room  - Monitoring included automated blood pressure, continuous EKG, and continuous pulse oximetry  - The detailed conscious record is permanently stored in the [place] [id number]  - The following is the conscious sedation record:  Start Time: 10:20  End Time : 10:35  Duration: 15 minutes   Medications Administered: 1 mg Versed, 100 mcg Fentanyl      Mekhi Muller DO  Interventional Pain Management/PM&R   [place]
4

## 2023-05-29 ENCOUNTER — EMERGENCY (EMERGENCY)
Facility: HOSPITAL | Age: 58
LOS: 1 days | Discharge: HOME | End: 2023-05-29
Attending: EMERGENCY MEDICINE
Payer: MEDICAID

## 2023-05-29 VITALS
OXYGEN SATURATION: 99 % | HEIGHT: 68 IN | WEIGHT: 164.91 LBS | HEART RATE: 82 BPM | DIASTOLIC BLOOD PRESSURE: 94 MMHG | TEMPERATURE: 98 F | RESPIRATION RATE: 20 BRPM | SYSTOLIC BLOOD PRESSURE: 148 MMHG

## 2023-05-29 VITALS
OXYGEN SATURATION: 100 % | HEART RATE: 49 BPM | TEMPERATURE: 98 F | RESPIRATION RATE: 20 BRPM | DIASTOLIC BLOOD PRESSURE: 87 MMHG | SYSTOLIC BLOOD PRESSURE: 146 MMHG

## 2023-05-29 DIAGNOSIS — Z90.49 ACQUIRED ABSENCE OF OTHER SPECIFIED PARTS OF DIGESTIVE TRACT: Chronic | ICD-10-CM

## 2023-05-29 DIAGNOSIS — Z98.89 OTHER SPECIFIED POSTPROCEDURAL STATES: Chronic | ICD-10-CM

## 2023-05-29 LAB
ALBUMIN SERPL ELPH-MCNC: 4.1 G/DL — SIGNIFICANT CHANGE UP (ref 3.3–5.2)
ALP SERPL-CCNC: 79 U/L — SIGNIFICANT CHANGE UP (ref 40–120)
ALT FLD-CCNC: 24 U/L — SIGNIFICANT CHANGE UP
ANION GAP SERPL CALC-SCNC: 10 MMOL/L — SIGNIFICANT CHANGE UP (ref 5–17)
AST SERPL-CCNC: 26 U/L — SIGNIFICANT CHANGE UP
BASOPHILS # BLD AUTO: 0.04 K/UL — SIGNIFICANT CHANGE UP (ref 0–0.2)
BASOPHILS NFR BLD AUTO: 0.7 % — SIGNIFICANT CHANGE UP (ref 0–2)
BILIRUB SERPL-MCNC: 0.8 MG/DL — SIGNIFICANT CHANGE UP (ref 0.4–2)
BUN SERPL-MCNC: 8.6 MG/DL — SIGNIFICANT CHANGE UP (ref 8–20)
CALCIUM SERPL-MCNC: 9 MG/DL — SIGNIFICANT CHANGE UP (ref 8.4–10.5)
CHLORIDE SERPL-SCNC: 107 MMOL/L — SIGNIFICANT CHANGE UP (ref 96–108)
CO2 SERPL-SCNC: 25 MMOL/L — SIGNIFICANT CHANGE UP (ref 22–29)
CREAT SERPL-MCNC: 0.84 MG/DL — SIGNIFICANT CHANGE UP (ref 0.5–1.3)
EGFR: 101 ML/MIN/1.73M2 — SIGNIFICANT CHANGE UP
EOSINOPHIL # BLD AUTO: 0.52 K/UL — HIGH (ref 0–0.5)
EOSINOPHIL NFR BLD AUTO: 9.6 % — HIGH (ref 0–6)
GLUCOSE SERPL-MCNC: 139 MG/DL — HIGH (ref 70–99)
HCT VFR BLD CALC: 44.1 % — SIGNIFICANT CHANGE UP (ref 39–50)
HGB BLD-MCNC: 14.9 G/DL — SIGNIFICANT CHANGE UP (ref 13–17)
IMM GRANULOCYTES NFR BLD AUTO: 0.2 % — SIGNIFICANT CHANGE UP (ref 0–0.9)
LYMPHOCYTES # BLD AUTO: 1.98 K/UL — SIGNIFICANT CHANGE UP (ref 1–3.3)
LYMPHOCYTES # BLD AUTO: 36.5 % — SIGNIFICANT CHANGE UP (ref 13–44)
MCHC RBC-ENTMCNC: 29.5 PG — SIGNIFICANT CHANGE UP (ref 27–34)
MCHC RBC-ENTMCNC: 33.8 GM/DL — SIGNIFICANT CHANGE UP (ref 32–36)
MCV RBC AUTO: 87.3 FL — SIGNIFICANT CHANGE UP (ref 80–100)
MONOCYTES # BLD AUTO: 0.37 K/UL — SIGNIFICANT CHANGE UP (ref 0–0.9)
MONOCYTES NFR BLD AUTO: 6.8 % — SIGNIFICANT CHANGE UP (ref 2–14)
NEUTROPHILS # BLD AUTO: 2.5 K/UL — SIGNIFICANT CHANGE UP (ref 1.8–7.4)
NEUTROPHILS NFR BLD AUTO: 46.2 % — SIGNIFICANT CHANGE UP (ref 43–77)
NT-PROBNP SERPL-SCNC: 34 PG/ML — SIGNIFICANT CHANGE UP (ref 0–300)
PLATELET # BLD AUTO: 224 K/UL — SIGNIFICANT CHANGE UP (ref 150–400)
POTASSIUM SERPL-MCNC: 3.6 MMOL/L — SIGNIFICANT CHANGE UP (ref 3.5–5.3)
POTASSIUM SERPL-SCNC: 3.6 MMOL/L — SIGNIFICANT CHANGE UP (ref 3.5–5.3)
PROT SERPL-MCNC: 6.7 G/DL — SIGNIFICANT CHANGE UP (ref 6.6–8.7)
RBC # BLD: 5.05 M/UL — SIGNIFICANT CHANGE UP (ref 4.2–5.8)
RBC # FLD: 12.6 % — SIGNIFICANT CHANGE UP (ref 10.3–14.5)
SODIUM SERPL-SCNC: 142 MMOL/L — SIGNIFICANT CHANGE UP (ref 135–145)
TROPONIN T SERPL-MCNC: <0.01 NG/ML — SIGNIFICANT CHANGE UP (ref 0–0.06)
TROPONIN T SERPL-MCNC: <0.01 NG/ML — SIGNIFICANT CHANGE UP (ref 0–0.06)
WBC # BLD: 5.42 K/UL — SIGNIFICANT CHANGE UP (ref 3.8–10.5)
WBC # FLD AUTO: 5.42 K/UL — SIGNIFICANT CHANGE UP (ref 3.8–10.5)

## 2023-05-29 PROCEDURE — 99285 EMERGENCY DEPT VISIT HI MDM: CPT

## 2023-05-29 PROCEDURE — 96375 TX/PRO/DX INJ NEW DRUG ADDON: CPT

## 2023-05-29 PROCEDURE — 80053 COMPREHEN METABOLIC PANEL: CPT

## 2023-05-29 PROCEDURE — 84484 ASSAY OF TROPONIN QUANT: CPT

## 2023-05-29 PROCEDURE — 96374 THER/PROPH/DIAG INJ IV PUSH: CPT

## 2023-05-29 PROCEDURE — 83880 ASSAY OF NATRIURETIC PEPTIDE: CPT

## 2023-05-29 PROCEDURE — 93005 ELECTROCARDIOGRAM TRACING: CPT

## 2023-05-29 PROCEDURE — 71045 X-RAY EXAM CHEST 1 VIEW: CPT

## 2023-05-29 PROCEDURE — 36415 COLL VENOUS BLD VENIPUNCTURE: CPT

## 2023-05-29 PROCEDURE — T1013: CPT

## 2023-05-29 PROCEDURE — 85025 COMPLETE CBC W/AUTO DIFF WBC: CPT

## 2023-05-29 PROCEDURE — 93010 ELECTROCARDIOGRAM REPORT: CPT

## 2023-05-29 PROCEDURE — 71045 X-RAY EXAM CHEST 1 VIEW: CPT | Mod: 26

## 2023-05-29 PROCEDURE — 99285 EMERGENCY DEPT VISIT HI MDM: CPT | Mod: 25

## 2023-05-29 RX ORDER — SODIUM CHLORIDE 9 MG/ML
1000 INJECTION INTRAMUSCULAR; INTRAVENOUS; SUBCUTANEOUS ONCE
Refills: 0 | Status: COMPLETED | OUTPATIENT
Start: 2023-05-29 | End: 2023-05-29

## 2023-05-29 RX ORDER — DIAZEPAM 5 MG
5 TABLET ORAL ONCE
Refills: 0 | Status: DISCONTINUED | OUTPATIENT
Start: 2023-05-29 | End: 2023-05-29

## 2023-05-29 RX ORDER — METOCLOPRAMIDE HCL 10 MG
10 TABLET ORAL ONCE
Refills: 0 | Status: COMPLETED | OUTPATIENT
Start: 2023-05-29 | End: 2023-05-29

## 2023-05-29 RX ORDER — ASPIRIN/CALCIUM CARB/MAGNESIUM 324 MG
324 TABLET ORAL ONCE
Refills: 0 | Status: COMPLETED | OUTPATIENT
Start: 2023-05-29 | End: 2023-05-29

## 2023-05-29 RX ADMIN — Medication 324 MILLIGRAM(S): at 13:51

## 2023-05-29 RX ADMIN — Medication 5 MILLIGRAM(S): at 13:52

## 2023-05-29 RX ADMIN — Medication 10 MILLIGRAM(S): at 13:51

## 2023-05-29 RX ADMIN — SODIUM CHLORIDE 1000 MILLILITER(S): 9 INJECTION INTRAMUSCULAR; INTRAVENOUS; SUBCUTANEOUS at 14:16

## 2023-05-29 NOTE — ED ADULT NURSE NOTE - NSFALLUNIVINTERV_ED_ALL_ED
Bed/Stretcher in lowest position, wheels locked, appropriate side rails in place/Call bell, personal items and telephone in reach/Instruct patient to call for assistance before getting out of bed/chair/stretcher/Non-slip footwear applied when patient is off stretcher/[place] to call system/Physically safe environment - no spills, clutter or unnecessary equipment/Purposeful proactive rounding/Room/bathroom lighting operational, light cord in reach

## 2023-05-29 NOTE — ED PROVIDER NOTE - CLINICAL SUMMARY MEDICAL DECISION MAKING FREE TEXT BOX
patient with past medical history of hypertension presents to ED with intermittent chest pain and headache.   EKG without ischemic changes.  Labs, chest x-ray ordered. patient with past medical history of hypertension presents to ED with intermittent chest pain and headache.   EKG without ischemic changes.  Labs, chest x-ray ordered. Workup is unremarkable for any emergent process.   Patient is now feeling improved and wishes to leave.  Patient / family members have capacity.    Patient/family was given full return precautions, counseled on red flag symptoms such as LOC, fever, severe pain, or focal deficits and advised to return to the ED for these reasons or any reason that was concerning to them. Patient/family was informed of all significant and incidental findings found on this workup today and all results were reviewed.   All questions were answered, advised to make close follow up with their primary care provider and specialty clinics (as applicable) to follow up with this visit and continue investigation/treatment.   Patient/family has shown adequate understanding and is agreeable to the plan.

## 2023-05-29 NOTE — ED PROVIDER NOTE - NSFOLLOWUPINSTRUCTIONS_ED_ALL_ED_FT
Dolor de pecho no específico en los adultos  Nonspecific Chest Pain, Adult  El dolor de pecho es tonya sensación molesta, opresiva o dolorosa en el pecho. El dolor se siente tra tonya aplastamiento, torsión u opresión en el pecho. Tonya persona puede sentir tonya sensación de ardor u hormigueo. El dolor de pecho también se puede sentir en la espalda, el pacheco, la mandíbula, el hombro o el brazo. Mayuri dolor puede empeorar al moverse, estornudar o respirar profundamente.    El dolor de pecho puede deberse a tonya afección potencialmente mortal. Se debe tratar de inmediato. También puede ser provocado por algo que no es potencialmente mortal. Si tiene dolor de pecho, puede ser difícil saber la diferencia, por lo tanto es importante que obtenga ayuda de inmediato para asegurarse de que no tiene tonya afección grave.    Algunas causas potencialmente mortales del dolor de pecho incluyen:  Infarto de miocardio.  Un desgarro en el vaso sanguíneo principal del cuerpo (disección aórtica).  Inflamación alrededor del corazón (pericarditis).  Un problema en los pulmones, tra un coágulo de barbara (embolia pulmonar) o un pulmón colapsado (neumotórax).  Algunas causas de dolor de pecho que no son potencialmente mortales incluyen:  Acidez estomacal.  Ansiedad o estrés.  Daño de los huesos, los músculos y los cartílagos que conforman la pared torácica.  Neumonía o bronquitis.  Culebrilla (virus de la varicela zóster).  El dolor de pecho puede aparecer y desaparecer. También puede ser yola. Oden médico le hará pruebas clínicas y otros estudios para tratar de determinar la causa del dolor. El tratamiento dependerá de la causa del dolor de pecho.    Siga estas instrucciones en oden casa:  Medicamentos    Use los medicamentos de venta rajesh y los recetados solamente tra se lo haya indicado el médico.  Si le recetaron un antibiótico, tómelo tra se lo haya indicado el médico. No deje de muriel el antibiótico aunque comience a sentirse mejor.  Actividad    Evite las actividades que le causen dolor de pecho.  No levante ningún objeto que pese más de 10 libras (4,5 kg) o que supere el límite de peso que le hayan indicado, hasta que el médico le diga que puede hacerlo.  Darwin reposo tra se lo haya indicado el médico.  Retome whitney actividades normales solo tra se lo haya indicado el médico. Pregúntele al médico qué actividades son seguras para usted.  Estilo de ramez    A plate along with examples of foods in a healthy diet.  Silhouette of a person sitting on the floor doing yoga.  No consuma ningún producto que contenga nicotina o tabaco, tra cigarrillos, cigarrillos electrónicos y tabaco de mascar. Si necesita ayuda para dejar de fumar, consulte al médico.  No marcello alcohol.  Opte por un estilo de ramez saludable tra se lo hayan recomendado. Puede incluir:  Practicar actividad física con regularidad. Pídale al médico que le sugiera ejercicios que jas seguros para usted.  Seguir tonya dieta cardiosaludable. Esta debe incluir muchas frutas y verduras frescas, cereales integrales, proteínas (magras) con bajo contenido de grasa y productos lácteos bajos en grasa. Un nutricionista podrá ayudarlo a hacer elecciones de alimentación saludables.  Mantener un peso saludable.  Controlar cualquier otra afección que tenga, tra presión arterial massiel (hipertensión) o diabetes.  Disminuir el nivel de estrés; por ejemplo, con yoga o técnicas de relajación.  Instrucciones generales    Esté atento a cualquier cambio en los síntomas.  Es oden responsabilidad obtener los resultados de cualquier prueba que se haya realizado. Consulte al médico o pregunte en el departamento donde se realizan las pruebas cuándo estarán listos los resultados.  Concurra a todas las visitas de seguimiento tra se lo haya indicado el médico. Dysart es importante.  Es posible que le pidan que se someta a más pruebas si el dolor de pecho no desaparece.  Comuníquese con un médico si:  El dolor de pecho no desaparece.  Se siente deprimido.  Tiene fiebre.  Nota cambios en los síntomas o desarrolla síntomas nuevos.  Solicite ayuda de inmediato si:  El dolor en el pecho empeora.  Tiene tos que empeora o tose con barbara.  Siente un dolor intenso en el abdomen.  Se desmaya.  Tiene tonya molestia repentina e inexplicable en el pecho.  Tiene molestias repentinas e inexplicables en los brazos, la espalda, el pacheco o la mandíbula.  Le falta el aire en cualquier momento.  Comienza a sudar de manera repentina o la piel se le humedece.  Siente náuseas o vomita.  Se siente repentinamente mareado o se desmaya.  Tiene debilidad intensa, o debilidad o fatiga sin explicación.  Siente que el corazón comienza a latir rápidamente o que se saltea latidos.  Estos síntomas pueden representar un problema grave que constituye tonya emergencia. No espere a toribio si los síntomas desaparecen. Solicite atención médica de inmediato. Comuníquese con el servicio de emergencias de oden localidad (911 en los [place]). No conduzca por whitney propios medios hasta el hospital.    Resumen  El dolor de pecho puede ser provocado por tonya afección que es grave y requiere tratamiento urgente. También puede ser provocado por algo que no es potencialmente mortal.  El médico puede hacerle pruebas clínicas y otros estudios para tratar de determinar la causa del dolor.  Siga las instrucciones de oden médico sobre muriel medicamentos, hacer cambios en oden estilo de ramez y recibir tratamiento de urgencia si los síntomas empeoran.  Concurra a todas las visitas de seguimiento tra se lo haya indicado el médico. Dysart incluye las visitas para realizarle otras pruebas si el dolor de pecho no desaparece.  Esta información no tiene tra fin reemplazar el consejo del médico. Asegúrese de hacerle al médico cualquier pregunta que tenga.

## 2023-05-29 NOTE — ED ADULT TRIAGE NOTE - MODE OF ARRIVAL
Walk in [Palpitations] : no palpitations [SOB] : no dyspnea [Dizziness] : no dizziness [Syncope] : no syncope [Chest Pain] : no chest pain or discomfort [ICD Shocks] : no recent ICD shocks [Pain at Site] : no pain at device site [Shoulder Pain] : no shoulder pain [Erythema at Site] : no erythema at device site [Swelling at Site] : no swelling at device site [FreeTextEntry1] : Mr. Cheema is a 50 year old M LBBB and non ischemic cardiomyopathy, on optimal heart failure medications s/p BiV 3/25/19, who was called in secondary to events on home transmission.\par \par  Mr. Cheema was diagnosed with systolic heart failure about 3 years ago.  He was found to have normal coronaries and over the past year has been uptitrated on optimal heart failure medication.  He was seen in our office and was offered a BiV in 2017 but felt that he was too young and healthy and deferred it.  He has been following up with heart failure and has been compliant with all medications.  Last echo 12/2018 EF 10%.  He however, experienced SOB in December and was hospitalized for a heart failure exacerbation.  He was diuresed and discharged and presented to our office and decided it was time to procedure with the device.  Since implant he has been doing well and notes more energy.  He has no device related complaints.  Remote transmission showed SVT (likely AT) with rapid ventricular rates and the device tried to ATP him out of this (thought was VT).  He is asymptomatic from this.    He denies any limitations in exercise tolerance and any BLEVINS, chest pain, palpitations, orthopnea, PND, syncope or near syncope.  \par  \par  \par \par Echo: 6/12/17 TTE: LVEDD 6.4 cm, LVEF 15-20%, Mild to mod MR,  LA mildly dilated, normal RV size and function, Trace TR.\par Echo 12/19 EF 10%\par \par Echo 6/18/16 TTE: LVEDD 6.7 cm, LVEF 15%, mod-severe MR, grade 4 diastolic dysfunction, normal RV size and function, mild TR.\par \par Cardiac Cath: 6/20/16  Ao 89/70/79. No coronary artery disease. RHC: RA 12, RV 39/13, PA 37/21/30, PCW 24, CO/CI (TD) 2.6/1.3, PVR 2.31, SVR 2063, PA 43.7%. \par

## 2023-05-29 NOTE — ED ADULT NURSE NOTE - SUICIDE SCREENING DEPRESSION
[facility]  [address] [address]  [phone]          May 2, 2017    RE:  Donnie Ernandez                                                                                                                                                       [address] [place] [address]            To whom it may concern:    Donnie has been evaluated today, May 2, 2017 for an acute illness that has been persisting for the last 2 weeks.  Please excuse him from work today due to persistent significant sore throat and fatigue.      Sincerely,        Sudhir Fraga MD    
Negative

## 2023-05-29 NOTE — ED PROVIDER NOTE - OBJECTIVE STATEMENT
D8-year-old male patient with past medical history of hypertension presents to ED with intermittent chest pain and headache for 1 week.  Patient reports he has not been able to sleep for the past week.  Chest pain is not associated with exertion.  Chest pain will onset while he is walking around or when he is laying down, it is associated w/ SOB.  Headache is not associated with chest pain. Pt reports feeling anxious. He denies numbness, tingling, focal weakness, fever, chills, cough, hemoptysis, or any other complaints.

## 2023-05-29 NOTE — ED PROVIDER NOTE - PATIENT PORTAL LINK FT
You can access the FollowMyHealth Patient Portal offered by [facility] by registering at the following website: [email or web address]. By joining [employer]’s FollowMyHealth portal, you will also be able to view your health information using other applications (apps) compatible with our system.

## 2023-05-29 NOTE — ED ADULT NURSE NOTE - NS ED NURSE LEVEL OF CONSCIOUSNESS MENTAL STATUS
Apixaban/Eliquis - Compliance/Apixaban/Eliquis - Dietary Advice/Apixaban/Eliquis - Follow up monitoring/Apixaban/Eliquis - Potential for adverse drug reactions and interactions
Awake/Alert

## 2023-05-29 NOTE — ED PROVIDER NOTE - ATTENDING CONTRIBUTION TO CARE
Dr. Linder, Attending Physician-  I performed a face to face bedside interview with patient regarding history of present illness, review of symptoms and past medical history. I completed an independent physical exam.  I have discussed patient's plan of care with the resident.

## 2023-05-29 NOTE — ED ADULT NURSE NOTE - OBJECTIVE STATEMENT
Pt presents to ED c/o chest pain, b/l black pain and insomnia for 3 days. Pt reports that he did not sleep at all last night r/t pain. Endorses associated SOB which is present during episodes of chest pain. Denies N/V/dizziness.

## 2023-05-29 NOTE — ED PROVIDER NOTE - NSFOLLOWUPCLINICS_GEN_ALL_ED_FT
Cardiology at [place] ([facility])  Cardiology  [address], [address]  [place] [address]  Phone:   Fax:     [facility]  Cardiology  [address] [place] [address]  Phone: [phone]  Fax:

## 2023-06-07 ENCOUNTER — EMERGENCY (EMERGENCY)
Facility: HOSPITAL | Age: 58
LOS: 1 days | Discharge: HOME | End: 2023-06-07
Attending: EMERGENCY MEDICINE
Payer: MEDICAID

## 2023-06-07 VITALS
WEIGHT: 191.36 LBS | OXYGEN SATURATION: 96 % | SYSTOLIC BLOOD PRESSURE: 155 MMHG | HEART RATE: 67 BPM | HEIGHT: 68 IN | RESPIRATION RATE: 19 BRPM | TEMPERATURE: 98 F | DIASTOLIC BLOOD PRESSURE: 87 MMHG

## 2023-06-07 DIAGNOSIS — Z98.89 OTHER SPECIFIED POSTPROCEDURAL STATES: Chronic | ICD-10-CM

## 2023-06-07 DIAGNOSIS — Z90.49 ACQUIRED ABSENCE OF OTHER SPECIFIED PARTS OF DIGESTIVE TRACT: Chronic | ICD-10-CM

## 2023-06-07 LAB
ALBUMIN SERPL ELPH-MCNC: 4.1 G/DL — SIGNIFICANT CHANGE UP (ref 3.3–5.2)
ALP SERPL-CCNC: 72 U/L — SIGNIFICANT CHANGE UP (ref 40–120)
ALT FLD-CCNC: 26 U/L — SIGNIFICANT CHANGE UP
ANION GAP SERPL CALC-SCNC: 7 MMOL/L — SIGNIFICANT CHANGE UP (ref 5–17)
AST SERPL-CCNC: 28 U/L — SIGNIFICANT CHANGE UP
BASOPHILS # BLD AUTO: 0.03 K/UL — SIGNIFICANT CHANGE UP (ref 0–0.2)
BASOPHILS NFR BLD AUTO: 0.5 % — SIGNIFICANT CHANGE UP (ref 0–2)
BILIRUB SERPL-MCNC: 1 MG/DL — SIGNIFICANT CHANGE UP (ref 0.4–2)
BUN SERPL-MCNC: 11.9 MG/DL — SIGNIFICANT CHANGE UP (ref 8–20)
CALCIUM SERPL-MCNC: 9 MG/DL — SIGNIFICANT CHANGE UP (ref 8.4–10.5)
CHLORIDE SERPL-SCNC: 107 MMOL/L — SIGNIFICANT CHANGE UP (ref 96–108)
CO2 SERPL-SCNC: 28 MMOL/L — SIGNIFICANT CHANGE UP (ref 22–29)
CREAT SERPL-MCNC: 0.87 MG/DL — SIGNIFICANT CHANGE UP (ref 0.5–1.3)
D DIMER BLD IA.RAPID-MCNC: <150 NG/ML DDU — SIGNIFICANT CHANGE UP
EGFR: 100 ML/MIN/1.73M2 — SIGNIFICANT CHANGE UP
EOSINOPHIL # BLD AUTO: 1 K/UL — HIGH (ref 0–0.5)
EOSINOPHIL NFR BLD AUTO: 18.3 % — HIGH (ref 0–6)
GLUCOSE SERPL-MCNC: 103 MG/DL — HIGH (ref 70–99)
HCT VFR BLD CALC: 44.2 % — SIGNIFICANT CHANGE UP (ref 39–50)
HGB BLD-MCNC: 14.8 G/DL — SIGNIFICANT CHANGE UP (ref 13–17)
IMM GRANULOCYTES NFR BLD AUTO: 0.2 % — SIGNIFICANT CHANGE UP (ref 0–0.9)
LYMPHOCYTES # BLD AUTO: 1.72 K/UL — SIGNIFICANT CHANGE UP (ref 1–3.3)
LYMPHOCYTES # BLD AUTO: 31.5 % — SIGNIFICANT CHANGE UP (ref 13–44)
MCHC RBC-ENTMCNC: 29.3 PG — SIGNIFICANT CHANGE UP (ref 27–34)
MCHC RBC-ENTMCNC: 33.5 GM/DL — SIGNIFICANT CHANGE UP (ref 32–36)
MCV RBC AUTO: 87.5 FL — SIGNIFICANT CHANGE UP (ref 80–100)
MONOCYTES # BLD AUTO: 0.34 K/UL — SIGNIFICANT CHANGE UP (ref 0–0.9)
MONOCYTES NFR BLD AUTO: 6.2 % — SIGNIFICANT CHANGE UP (ref 2–14)
NEUTROPHILS # BLD AUTO: 2.36 K/UL — SIGNIFICANT CHANGE UP (ref 1.8–7.4)
NEUTROPHILS NFR BLD AUTO: 43.3 % — SIGNIFICANT CHANGE UP (ref 43–77)
PLATELET # BLD AUTO: 212 K/UL — SIGNIFICANT CHANGE UP (ref 150–400)
POTASSIUM SERPL-MCNC: 4.1 MMOL/L — SIGNIFICANT CHANGE UP (ref 3.5–5.3)
POTASSIUM SERPL-SCNC: 4.1 MMOL/L — SIGNIFICANT CHANGE UP (ref 3.5–5.3)
PROT SERPL-MCNC: 6.7 G/DL — SIGNIFICANT CHANGE UP (ref 6.6–8.7)
RBC # BLD: 5.05 M/UL — SIGNIFICANT CHANGE UP (ref 4.2–5.8)
RBC # FLD: 12.8 % — SIGNIFICANT CHANGE UP (ref 10.3–14.5)
SODIUM SERPL-SCNC: 142 MMOL/L — SIGNIFICANT CHANGE UP (ref 135–145)
TROPONIN T SERPL-MCNC: <0.01 NG/ML — SIGNIFICANT CHANGE UP (ref 0–0.06)
WBC # BLD: 5.46 K/UL — SIGNIFICANT CHANGE UP (ref 3.8–10.5)
WBC # FLD AUTO: 5.46 K/UL — SIGNIFICANT CHANGE UP (ref 3.8–10.5)

## 2023-06-07 PROCEDURE — 99285 EMERGENCY DEPT VISIT HI MDM: CPT | Mod: 25

## 2023-06-07 PROCEDURE — 99284 EMERGENCY DEPT VISIT MOD MDM: CPT

## 2023-06-07 PROCEDURE — 71045 X-RAY EXAM CHEST 1 VIEW: CPT

## 2023-06-07 PROCEDURE — T1013: CPT

## 2023-06-07 PROCEDURE — 93005 ELECTROCARDIOGRAM TRACING: CPT

## 2023-06-07 PROCEDURE — 84484 ASSAY OF TROPONIN QUANT: CPT

## 2023-06-07 PROCEDURE — 80053 COMPREHEN METABOLIC PANEL: CPT

## 2023-06-07 PROCEDURE — 85379 FIBRIN DEGRADATION QUANT: CPT

## 2023-06-07 PROCEDURE — 36415 COLL VENOUS BLD VENIPUNCTURE: CPT

## 2023-06-07 PROCEDURE — 71045 X-RAY EXAM CHEST 1 VIEW: CPT | Mod: 26

## 2023-06-07 PROCEDURE — 85025 COMPLETE CBC W/AUTO DIFF WBC: CPT

## 2023-06-07 PROCEDURE — 93010 ELECTROCARDIOGRAM REPORT: CPT

## 2023-06-07 RX ORDER — ALPRAZOLAM 0.25 MG
0.5 TABLET ORAL ONCE
Refills: 0 | Status: DISCONTINUED | OUTPATIENT
Start: 2023-06-07 | End: 2023-06-07

## 2023-06-07 RX ADMIN — Medication 0.5 MILLIGRAM(S): at 13:11

## 2023-06-07 NOTE — ED PROVIDER NOTE - DATE/TIME OF ACCEPTANCE
Infant Monitor Program Note:  Left voice message on foster mom's number to contact [facility] to schedule  of apnea monitor.
07-Jun-2023 10:15

## 2023-06-07 NOTE — ED ADULT NURSE NOTE - NSFALLUNIVINTERV_ED_ALL_ED
Bed/Stretcher in lowest position, wheels locked, appropriate side rails in place/Call bell, personal items and telephone in reach/Instruct patient to call for assistance before getting out of bed/chair/stretcher/Non-slip footwear applied when patient is off stretcher/[place] to call system/Physically safe environment - no spills, clutter or unnecessary equipment/Purposeful proactive rounding/Room/bathroom lighting operational, light cord in reach

## 2023-06-07 NOTE — ED ADULT NURSE NOTE - NEURO BEHAVIOR
Health Maintenance Due   Topic Date Due   • COVID-19 Vaccine (1) Never done   • Annual Physical (ages 3-18)  07/20/2022   • Influenza Vaccine (1) 09/01/2022       Patient is due for topics as listed above but is not proceeding with Immunization(s) COVID-19 and Influenza at this time.    calm

## 2023-06-07 NOTE — ED ADULT NURSE NOTE - NSFALLOOBATTEMPT_ED_ALL_ED
PATIENT: Abeba Bethea    MRN: [id number]    DATE OF PROCEDURE: 08/05/[facility]  [facility]     No

## 2023-06-07 NOTE — ED ADULT NURSE NOTE - ED CARDIAC HEART SOUNDS
Refill passed per [facility], [facility] protocol.   Hypertensive Medications  Protocol Criteria:  · Appointment scheduled in the past 6 months or in the next 3 months  · BMP or CMP in the past 12 months  · Creatinine result < 2  Recent Outpatient Visits <130   Recent Outpatient Visits            4 months ago Proliferative diabetic retinopathy with macular edema associated with type 2 diabetes mellitus, unspecified laterality [facility])    Milla Hurtado, Duc Gao MD    Office [facility] Office Visit    1 year ago Encounter for gynecological examination without abnormal finding    [facility] [address], [place]    Office Visit    2 years ago Type 2 diabetes mellitus without complication, withou normal S1, S2 heard

## 2023-06-07 NOTE — ED PROVIDER NOTE - CLINICAL SUMMARY MEDICAL DECISION MAKING FREE TEXT BOX
labs and diagnostic imaging results reviewed with patient; clinic follow up recommended for reassessment. Patient is aware of signs/symptoms to return to the emergency department.

## 2023-06-07 NOTE — ED PROVIDER NOTE - PATIENT PORTAL LINK FT
You can access the FollowMyHealth Patient Portal offered by [facility] by registering at the following website: [email or web address]. By joining [employer]’s FollowMyHealth portal, you will also be able to view your health information using other applications (apps) compatible with our system.

## 2023-06-07 NOTE — ED ADULT NURSE NOTE - OBJECTIVE STATEMENT
PT c/o neck, head and chest pain radiating across for the past week +. was seen and discharged from the ED last week and stated that medication that he was sent home with helped. pain is reproducible with movement and breathing. denies any previous cardiac hx. hx of htn.

## 2023-06-13 ENCOUNTER — EMERGENCY (EMERGENCY)
Facility: HOSPITAL | Age: 58
LOS: 1 days | Discharge: HOME | End: 2023-06-13
Attending: EMERGENCY MEDICINE
Payer: MEDICAID

## 2023-06-13 VITALS
HEART RATE: 60 BPM | RESPIRATION RATE: 18 BRPM | OXYGEN SATURATION: 96 % | SYSTOLIC BLOOD PRESSURE: 151 MMHG | DIASTOLIC BLOOD PRESSURE: 84 MMHG | HEIGHT: 68 IN | TEMPERATURE: 99 F | WEIGHT: 194.01 LBS

## 2023-06-13 DIAGNOSIS — Z98.89 OTHER SPECIFIED POSTPROCEDURAL STATES: Chronic | ICD-10-CM

## 2023-06-13 DIAGNOSIS — Z90.49 ACQUIRED ABSENCE OF OTHER SPECIFIED PARTS OF DIGESTIVE TRACT: Chronic | ICD-10-CM

## 2023-06-13 LAB
ALBUMIN SERPL ELPH-MCNC: 4.1 G/DL — SIGNIFICANT CHANGE UP (ref 3.3–5.2)
ALP SERPL-CCNC: 79 U/L — SIGNIFICANT CHANGE UP (ref 40–120)
ALT FLD-CCNC: 22 U/L — SIGNIFICANT CHANGE UP
ANION GAP SERPL CALC-SCNC: 9 MMOL/L — SIGNIFICANT CHANGE UP (ref 5–17)
AST SERPL-CCNC: 22 U/L — SIGNIFICANT CHANGE UP
BASOPHILS # BLD AUTO: 0.03 K/UL — SIGNIFICANT CHANGE UP (ref 0–0.2)
BASOPHILS NFR BLD AUTO: 0.6 % — SIGNIFICANT CHANGE UP (ref 0–2)
BILIRUB SERPL-MCNC: 0.4 MG/DL — SIGNIFICANT CHANGE UP (ref 0.4–2)
BUN SERPL-MCNC: 13.2 MG/DL — SIGNIFICANT CHANGE UP (ref 8–20)
CALCIUM SERPL-MCNC: 9.1 MG/DL — SIGNIFICANT CHANGE UP (ref 8.4–10.5)
CHLORIDE SERPL-SCNC: 106 MMOL/L — SIGNIFICANT CHANGE UP (ref 96–108)
CO2 SERPL-SCNC: 25 MMOL/L — SIGNIFICANT CHANGE UP (ref 22–29)
CREAT SERPL-MCNC: 0.84 MG/DL — SIGNIFICANT CHANGE UP (ref 0.5–1.3)
EGFR: 101 ML/MIN/1.73M2 — SIGNIFICANT CHANGE UP
EOSINOPHIL # BLD AUTO: 0.39 K/UL — SIGNIFICANT CHANGE UP (ref 0–0.5)
EOSINOPHIL NFR BLD AUTO: 7.6 % — HIGH (ref 0–6)
GLUCOSE SERPL-MCNC: 111 MG/DL — HIGH (ref 70–99)
HCT VFR BLD CALC: 43.8 % — SIGNIFICANT CHANGE UP (ref 39–50)
HGB BLD-MCNC: 14.8 G/DL — SIGNIFICANT CHANGE UP (ref 13–17)
IMM GRANULOCYTES NFR BLD AUTO: 0.2 % — SIGNIFICANT CHANGE UP (ref 0–0.9)
LYMPHOCYTES # BLD AUTO: 1.32 K/UL — SIGNIFICANT CHANGE UP (ref 1–3.3)
LYMPHOCYTES # BLD AUTO: 25.7 % — SIGNIFICANT CHANGE UP (ref 13–44)
MAGNESIUM SERPL-MCNC: 2.2 MG/DL — SIGNIFICANT CHANGE UP (ref 1.6–2.6)
MCHC RBC-ENTMCNC: 29.5 PG — SIGNIFICANT CHANGE UP (ref 27–34)
MCHC RBC-ENTMCNC: 33.8 GM/DL — SIGNIFICANT CHANGE UP (ref 32–36)
MCV RBC AUTO: 87.3 FL — SIGNIFICANT CHANGE UP (ref 80–100)
MONOCYTES # BLD AUTO: 0.36 K/UL — SIGNIFICANT CHANGE UP (ref 0–0.9)
MONOCYTES NFR BLD AUTO: 7 % — SIGNIFICANT CHANGE UP (ref 2–14)
NEUTROPHILS # BLD AUTO: 3.03 K/UL — SIGNIFICANT CHANGE UP (ref 1.8–7.4)
NEUTROPHILS NFR BLD AUTO: 58.9 % — SIGNIFICANT CHANGE UP (ref 43–77)
PLATELET # BLD AUTO: 210 K/UL — SIGNIFICANT CHANGE UP (ref 150–400)
POTASSIUM SERPL-MCNC: 3.9 MMOL/L — SIGNIFICANT CHANGE UP (ref 3.5–5.3)
POTASSIUM SERPL-SCNC: 3.9 MMOL/L — SIGNIFICANT CHANGE UP (ref 3.5–5.3)
PROT SERPL-MCNC: 6.9 G/DL — SIGNIFICANT CHANGE UP (ref 6.6–8.7)
RAPID RVP RESULT: SIGNIFICANT CHANGE UP
RBC # BLD: 5.02 M/UL — SIGNIFICANT CHANGE UP (ref 4.2–5.8)
RBC # FLD: 12.7 % — SIGNIFICANT CHANGE UP (ref 10.3–14.5)
SARS-COV-2 RNA SPEC QL NAA+PROBE: SIGNIFICANT CHANGE UP
SODIUM SERPL-SCNC: 140 MMOL/L — SIGNIFICANT CHANGE UP (ref 135–145)
TROPONIN T SERPL-MCNC: <0.01 NG/ML — SIGNIFICANT CHANGE UP (ref 0–0.06)
WBC # BLD: 5.14 K/UL — SIGNIFICANT CHANGE UP (ref 3.8–10.5)
WBC # FLD AUTO: 5.14 K/UL — SIGNIFICANT CHANGE UP (ref 3.8–10.5)

## 2023-06-13 PROCEDURE — 85025 COMPLETE CBC W/AUTO DIFF WBC: CPT

## 2023-06-13 PROCEDURE — 71045 X-RAY EXAM CHEST 1 VIEW: CPT | Mod: 26

## 2023-06-13 PROCEDURE — 93010 ELECTROCARDIOGRAM REPORT: CPT | Mod: 77

## 2023-06-13 PROCEDURE — T1013: CPT

## 2023-06-13 PROCEDURE — 99285 EMERGENCY DEPT VISIT HI MDM: CPT

## 2023-06-13 PROCEDURE — 99285 EMERGENCY DEPT VISIT HI MDM: CPT | Mod: 25

## 2023-06-13 PROCEDURE — 93010 ELECTROCARDIOGRAM REPORT: CPT

## 2023-06-13 PROCEDURE — 70450 CT HEAD/BRAIN W/O DYE: CPT | Mod: 26,MA

## 2023-06-13 PROCEDURE — 93005 ELECTROCARDIOGRAM TRACING: CPT

## 2023-06-13 PROCEDURE — 83735 ASSAY OF MAGNESIUM: CPT

## 2023-06-13 PROCEDURE — 36415 COLL VENOUS BLD VENIPUNCTURE: CPT

## 2023-06-13 PROCEDURE — 70450 CT HEAD/BRAIN W/O DYE: CPT | Mod: MA

## 2023-06-13 PROCEDURE — 84484 ASSAY OF TROPONIN QUANT: CPT

## 2023-06-13 PROCEDURE — 0225U NFCT DS DNA&RNA 21 SARSCOV2: CPT

## 2023-06-13 PROCEDURE — 71045 X-RAY EXAM CHEST 1 VIEW: CPT

## 2023-06-13 PROCEDURE — 80053 COMPREHEN METABOLIC PANEL: CPT

## 2023-06-13 NOTE — ED ADULT TRIAGE NOTE - CHIEF COMPLAINT QUOTE
Pt BIBEMS from home for chest discomfort, left shoulder pain, headache and neck pain for 3 days. Pt recently seen in the ED for chest pain and discharged. At this time, pt states "my whole body hurts, my chest and my head." Denies cardiac history, NSR on monitor as per EMS. EKG in progress.

## 2023-06-13 NOTE — ED ADULT NURSE NOTE - NSFALLUNIVINTERV_ED_ALL_ED
Bed/Stretcher in lowest position, wheels locked, appropriate side rails in place/Call bell, personal items and telephone in reach/Instruct patient to call for assistance before getting out of bed/chair/stretcher/Non-slip footwear applied when patient is off stretcher/[place] to call system/Physically safe environment - no spills, clutter or unnecessary equipment/Purposeful proactive rounding/Room/bathroom lighting operational, light cord in reach

## 2023-06-13 NOTE — ED PROVIDER NOTE - PROGRESS NOTE DETAILS
Pt feels well. Updated pt regarding benign workup results. Instructed pt to follow up with PMD and cardiologist as scheduled. Jhon Portillo MD

## 2023-06-13 NOTE — ED PROVIDER NOTE - NS ED ROS FT
General: Denies fever, chills  HEENT: Denies sore throat  Neck: Denies neck pain  Resp: Denies coughing, SOB  Cardiovascular: CP. Denies palpitations, LE edema  GI: Nausea. Denies vomiting, abdominal pain, diarrhea, constipation, blood in stool  : Denies dysuria, hematuria, frequency, incontinence  MSK: Denies back pain  Neuro: HA. Denies dizziness, numbness, weakness  Skin: Denies rashes.

## 2023-06-13 NOTE — ED ADULT NURSE NOTE - SUICIDE SCREENING QUESTION 2
Admitted to the ICU  Plan for QUENTIN with DCCV in AM  NPO after MN  TSH and Trop normal  Echo with 55% LVEF  Optimize electrolytes  Continue diltiazem, unlikely to control HR overnight, will tolerate ventricular rates <200 as long as BP remains preserved   No

## 2023-06-13 NOTE — ED PROVIDER NOTE - OBJECTIVE STATEMENT
58M hx HTN presents for several weeks of intermittent generalized body pains. Pt reports HA, chest pain with associated nausea. Pain is nonexertional and sometimes keeps him up at night. He states he is under a lot of stress and feels anxious lately. Otherwise no fever, cough, SOB, back pain, abd pain, focal weakness or vision changes. He is due to see a cardiologist within the next 2 weeks

## 2023-06-13 NOTE — ED PROVIDER NOTE - CLINICAL SUMMARY MEDICAL DECISION MAKING FREE TEXT BOX
58M hx HTN presents for several weeks of nonexertional chest pain and HA. Exam unremarkable. EKG nonischemic and unchanged from prior. Bloodwork and CXR WNL. Likely anxiety component. Pt has a cardiologist appointment scheduled. Discussed return precautions and need for followup. Pt was discharged in stable condition. 58M hx HTN presents for several weeks of nonexertional chest pain and HA. Exam unremarkable. EKG nonischemic and unchanged from prior. Bloodwork and CXR WNL. CT head obtained given persistent HA. Likely anxiety component. Pt has a cardiologist appointment scheduled. Discussed return precautions and need for followup. Pt was discharged in stable condition.

## 2023-06-13 NOTE — ED PROVIDER NOTE - NSFOLLOWUPINSTRUCTIONS_ED_ALL_ED_FT
Dolor en el pecho    El dolor de pecho puede ser causado por muchas condiciones diferentes que pueden o no ser peligrosas. Las causas incluyen acidez estomacal, infecciones pulmonares, ataque cardíaco, coágulos de barbara en los pulmones, infecciones de la piel, tensión o daño en los músculos, cartílagos o huesos, etc. Además de la historia clínica y el examen físico, un electrocardiograma (ECG) u otras pruebas de laboratorio pueden se lake realizado para determinar la causa de oden dolor de pecho. Darwin un seguimiento con oden proveedor de atención primaria o con un cardiólogo según las instrucciones.    BUSQUE ATENCIÓN MÉDICA INMEDIATA SI TIENE ALGUNO DE LOS SIGUIENTES SÍNTOMAS: empeoramiento del dolor en el pecho, tos con barbara, dolor inexplicable de espalda/pacheco/mandíbula, dolor abdominal intenso, mareos o aturdimiento, desmayos, dificultad para respirar, piel sudorosa o húmeda, vómitos, o latidos cardíacos acelerados. Estos síntomas pueden representar un problema grave que es tonya emergencia. No espere a toribio si los síntomas desaparecen. Obtenga ayuda médica de inmediato. Llame al 911 y no conduzca hasta el hospital.

## 2023-06-13 NOTE — ED PROVIDER NOTE - PHYSICAL EXAMINATION
General: Awake, alert, lying in bed in NAD  HEENT: Normocephalic, atraumatic. No scleral icterus or conjunctival injection. EOMI. Moist mucous membranes. Oropharynx clear.   Neck:. Soft and supple.  Cardiac: RRR, +S1/S2. No murmurs, rubs, gallops. Peripheral pulses 2+ and symmetric. No LE edema.  Resp: Lungs CTAB. Speaking in full sentences. No accessory muscle use  Abd: Soft, non-tender, non-distended. No guarding, rebound, or rigidity.  Back: Spine midline and non-tender. No CVA tenderness.    Skin: No rashes, abrasions, or lacerations.  Neuro: AO x 4. Moves all extremities symmetrically. Motor strength and sensation grossly intact. Nml gait, no dysmetria  Psych: Anxious

## 2023-06-13 NOTE — ED ADULT NURSE NOTE - OBJECTIVE STATEMENT
pt alert and oriented x4 comes in c/o pain to chest and head. pt states it has been like this for 3 days. respirations even unlabored.

## 2023-06-13 NOTE — ED PROVIDER NOTE - ATTENDING CONTRIBUTION TO CARE
The patient discussed with resident    Rudy mcrae I, Sean Vasquez, performed the initial face to face bedside interview with this patient regarding history of present illness, review of symptoms and relevant past medical, social and family history.  I completed an independent physical examination.  I was the initial provider who evaluated this patient. I have signed out the follow up of any pending tests (i.e. labs, radiological studies) to the resident.  I have communicated the patient’s plan of care and disposition with the resident

## 2023-06-13 NOTE — ED PROVIDER NOTE - PATIENT PORTAL LINK FT
You can access the FollowMyHealth Patient Portal offered by [facility] by registering at the following website: [email or web address]. By joining [facility]’s FollowMyHealth portal, you will also be able to view your health information using other applications (apps) compatible with our system. You can access the FollowMyHealth Patient Portal offered by [facility] by registering at the following website: [email or web address]. By joining [facility]’s FollowMyHealth portal, you will also be able to view your health information using other applications (apps) compatible with our system.

## 2023-07-19 ENCOUNTER — OFFICE (OUTPATIENT)
Dept: URBAN - METROPOLITAN AREA CLINIC 94 | Facility: CLINIC | Age: 58
Setting detail: OPHTHALMOLOGY
End: 2023-07-19
Payer: MEDICAID

## 2023-07-19 DIAGNOSIS — H16.223: ICD-10-CM

## 2023-07-19 DIAGNOSIS — H35.033: ICD-10-CM

## 2023-07-19 DIAGNOSIS — H02.012: ICD-10-CM

## 2023-07-19 DIAGNOSIS — Z96.1: ICD-10-CM

## 2023-07-19 PROBLEM — H02.052 TRICHIASIS, WITHOUT ENTROPION; RIGHT LOWER LID: Status: ACTIVE | Noted: 2023-07-19

## 2023-07-19 PROCEDURE — 92014 COMPRE OPH EXAM EST PT 1/>: CPT | Performed by: OPHTHALMOLOGY

## 2023-07-19 PROCEDURE — 92250 FUNDUS PHOTOGRAPHY W/I&R: CPT | Performed by: OPHTHALMOLOGY

## 2023-07-19 ASSESSMENT — KERATOMETRY
OD_K1POWER_DIOPTERS: 44.75
OD_K2POWER_DIOPTERS: 45.50
OS_K2POWER_DIOPTERS: 45.00
OS_K1POWER_DIOPTERS: 45.00
METHOD_AUTO_MANUAL: AUTO
OS_AXISANGLE_DEGREES: 090
OD_AXISANGLE_DEGREES: 099

## 2023-07-19 ASSESSMENT — REFRACTION_MANIFEST
OS_VA1: 20/60
OS_SPHERE: +1.00
OS_ADD: +2.00
OS_ADD: +1.50
OD_VA1: 20/30
OD_AXIS: 030
OS_VA2: 20/20
OD_CYLINDER: -0.50
OD_VA1: 20/50
OD_VA2: 20/20
OS_VA2: 20/20
OD_ADD: +1.50
OS_AXIS: 160
OD_CYLINDER: -0.50
OD_SPHERE: +0.50
OS_VA1: 20/25
OD_CYLINDER: SPH
OD_ADD: +2.00
OS_ADD: +2.00
OS_SPHERE: +1.25
OD_VA1: 20/30
OS_SPHERE: +1.00
OD_SPHERE: +1.00
OD_ADD: +2.00
OU_VA: 20/30
OS_CYLINDER: SPH
OD_AXIS: 050
OD_SPHERE: +0.75
OS_AXIS: 121
OU_VA: 20/25
OS_CYLINDER: SPH
OS_CYLINDER: -0.50
OS_VA1: 20/30
OD_VA1: 20/25
OD_SPHERE: +0.50
OS_VA1: 20/30
OD_CYLINDER: -0.50
OD_AXIS: 044
OS_SPHERE: +1.75
OD_VA2: 20/20
OS_CYLINDER: -0.50

## 2023-07-19 ASSESSMENT — AXIALLENGTH_DERIVED
OD_AL: 23.0562
OS_AL: 17.01
OD_AL: 22.8716
OS_AL: 22.5531
OD_AL: 16.94
OS_AL: 22.6874
OD_AL: 22.8259
OS_AL: 22.6424
OD_AL: 22.9175
OD_AL: 22.7351
OS_AL: 22.5087

## 2023-07-19 ASSESSMENT — REFRACTION_CURRENTRX
OD_CYLINDER: -0.50
OS_AXIS: 126
OD_AXIS: 056
OD_ADD: +1.50
OS_ADD: +1.50
OS_OVR_VA: 20/
OS_SPHERE: +1.25
OS_VPRISM_DIRECTION: BF
OS_CYLINDER: -0.50
OD_SPHERE: +1.00
OD_OVR_VA: 20/
OD_VPRISM_DIRECTION: BF

## 2023-07-19 ASSESSMENT — VISUAL ACUITY
OD_BCVA: 20/30-1
OS_BCVA: 20/30

## 2023-07-19 ASSESSMENT — REFRACTION_AUTOREFRACTION
OD_AXIS: 024
OD_CYLINDER: -0.25
OS_SPHERE: +1.50
OD_SPHERE: 0.00
OS_CYLINDER: -0.25
OS_AXIS: 064

## 2023-07-19 ASSESSMENT — TONOMETRY
OD_IOP_MMHG: 18
OS_IOP_MMHG: 15

## 2023-07-19 ASSESSMENT — REFRACTION_RETINOSCOPY
OD_AXIS: 080
OS_SPHERE: +1.25
OS_AXIS: 150
OD_CYLINDER: -0.75
OD_SPHERE: +0.75
OS_CYLINDER: -0.25

## 2023-07-19 ASSESSMENT — CONFRONTATIONAL VISUAL FIELD TEST (CVF)
OD_FINDINGS: FULL
OS_FINDINGS: FULL

## 2023-07-19 ASSESSMENT — SPHEQUIV_DERIVED
OD_SPHEQUIV: 0.5
OS_SPHEQUIV: 22
OD_SPHEQUIV: 22.25
OD_SPHEQUIV: 0.75
OS_SPHEQUIV: 1.375
OD_SPHEQUIV: -0.125
OS_SPHEQUIV: 1.125
OS_SPHEQUIV: 1.5
OD_SPHEQUIV: 0.25
OD_SPHEQUIV: 0.375
OS_SPHEQUIV: 1

## 2023-07-19 ASSESSMENT — SUPERFICIAL PUNCTATE KERATITIS (SPK)
OD_SPK: 2+
OS_SPK: 2+

## 2024-03-21 NOTE — ED ADULT TRIAGE NOTE - HEIGHT IN FEET
Spouse states patient was feeling better post RT, but about 2 weeks ago whilst in [place], he started feeling worse.  He has ear pain, raspy voice and just feels fatigued.  Pls advise on next steps.     5

## 2024-04-18 NOTE — ED PROVIDER NOTE - CPE EDP CARDIAC NORM
Minocycline Counseling: Patient advised regarding possible photosensitivity and discoloration of the teeth, skin, lips, tongue and gums.  Patient instructed to avoid sunlight, if possible.  When exposed to sunlight, patients should wear protective clothing, sunglasses, and sunscreen.  The patient was instructed to call the office immediately if the following severe adverse effects occur:  hearing changes, easy bruising/bleeding, severe headache, or vision changes.  The patient verbalized understanding of the proper use and possible adverse effects of minocycline.  All of the patient's questions and concerns were addressed. Topical Clindamycin Pregnancy And Lactation Text: This medication is Pregnancy Category B and is considered safe during pregnancy. It is unknown if it is excreted in breast milk. Birth Control Pills Pregnancy And Lactation Text: This medication should be avoided if pregnant and for the first 30 days post-partum. Topical Sulfur Applications Pregnancy And Lactation Text: This medication is Pregnancy Category C and has an unknown safety profile during pregnancy. It is unknown if this topical medication is excreted in breast milk. normal... Tazorac Pregnancy And Lactation Text: This medication is not safe during pregnancy. It is unknown if this medication is excreted in breast milk. Bactrim Pregnancy And Lactation Text: This medication is Pregnancy Category D and is known to cause fetal risk.  It is also excreted in breast milk. Spironolactone Counseling: Patient advised regarding risks of diarrhea, abdominal pain, hyperkalemia, birth defects (for female patients), liver toxicity and renal toxicity. The patient may need blood work to monitor liver and kidney function and potassium levels while on therapy. The patient verbalized understanding of the proper use and possible adverse effects of spironolactone.  All of the patient's questions and concerns were addressed. Tetracycline Counseling: Patient counseled regarding possible photosensitivity and increased risk for sunburn.  Patient instructed to avoid sunlight, if possible.  When exposed to sunlight, patients should wear protective clothing, sunglasses, and sunscreen.  The patient was instructed to call the office immediately if the following severe adverse effects occur:  hearing changes, easy bruising/bleeding, severe headache, or vision changes.  The patient verbalized understanding of the proper use and possible adverse effects of tetracycline.  All of the patient's questions and concerns were addressed. Patient understands to avoid pregnancy while on therapy due to potential birth defects. Sarecycline Counseling: Patient advised regarding possible photosensitivity and discoloration of the teeth, skin, lips, tongue and gums.  Patient instructed to avoid sunlight, if possible.  When exposed to sunlight, patients should wear protective clothing, sunglasses, and sunscreen.  The patient was instructed to call the office immediately if the following severe adverse effects occur:  hearing changes, easy bruising/bleeding, severe headache, or vision changes.  The patient verbalized understanding of the proper use and possible adverse effects of sarecycline.  All of the patient's questions and concerns were addressed. Include Pregnancy/Lactation Warning?: No Winlevi Pregnancy And Lactation Text: This medication is considered safe during pregnancy and breastfeeding. Dapsone Pregnancy And Lactation Text: This medication is Pregnancy Category C and is not considered safe during pregnancy or breast feeding. Erythromycin Counseling:  I discussed with the patient the risks of erythromycin including but not limited to GI upset, allergic reaction, drug rash, diarrhea, increase in liver enzymes, and yeast infections. Azelaic Acid Pregnancy And Lactation Text: This medication is considered safe during pregnancy and breast feeding. Doxycycline Counseling:  Patient counseled regarding possible photosensitivity and increased risk for sunburn.  Patient instructed to avoid sunlight, if possible.  When exposed to sunlight, patients should wear protective clothing, sunglasses, and sunscreen.  The patient was instructed to call the office immediately if the following severe adverse effects occur:  hearing changes, easy bruising/bleeding, severe headache, or vision changes.  The patient verbalized understanding of the proper use and possible adverse effects of doxycycline.  All of the patient's questions and concerns were addressed. Tetracycline Pregnancy And Lactation Text: This medication is Pregnancy Category D and not consider safe during pregnancy. It is also excreted in breast milk. Aklief Pregnancy And Lactation Text: It is unknown if this medication is safe to use during pregnancy.  It is unknown if this medication is excreted in breast milk.  Breastfeeding women should use the topical cream on the smallest area of the skin for the shortest time needed while breastfeeding.  Do not apply to nipple and areola. Azithromycin Pregnancy And Lactation Text: This medication is considered safe during pregnancy and is also secreted in breast milk. High Dose Vitamin A Counseling: Side effects reviewed, pt to contact office should one occur. Topical Retinoid Pregnancy And Lactation Text: This medication is Pregnancy Category C. It is unknown if this medication is excreted in breast milk. Benzoyl Peroxide Pregnancy And Lactation Text: This medication is Pregnancy Category C. It is unknown if benzoyl peroxide is excreted in breast milk. Isotretinoin Counseling: Patient should get monthly blood tests, not donate blood, not drive at night if vision affected, not share medication, and not undergo elective surgery for 6 months after tx completed. Side effects reviewed, pt to contact office should one occur. High Dose Vitamin A Pregnancy And Lactation Text: High dose vitamin A therapy is contraindicated during pregnancy and breast feeding. Topical Sulfur Applications Counseling: Topical Sulfur Counseling: Patient counseled that this medication may cause skin irritation or allergic reactions.  In the event of skin irritation, the patient was advised to reduce the amount of the drug applied or use it less frequently.   The patient verbalized understanding of the proper use and possible adverse effects of topical sulfur application.  All of the patient's questions and concerns were addressed. Detail Level: Zone Birth Control Pills Counseling: Birth Control Pill Counseling: I discussed with the patient the potential side effects of OCPs including but not limited to increased risk of stroke, heart attack, thrombophlebitis, deep venous thrombosis, hepatic adenomas, breast changes, GI upset, headaches, and depression.  The patient verbalized understanding of the proper use and possible adverse effects of OCPs. All of the patient's questions and concerns were addressed. Bactrim Counseling:  I discussed with the patient the risks of sulfa antibiotics including but not limited to GI upset, allergic reaction, drug rash, diarrhea, dizziness, photosensitivity, and yeast infections.  Rarely, more serious reactions can occur including but not limited to aplastic anemia, agranulocytosis, methemoglobinemia, blood dyscrasias, liver or kidney failure, lung infiltrates or desquamative/blistering drug rashes. Topical Clindamycin Counseling: Patient counseled that this medication may cause skin irritation or allergic reactions.  In the event of skin irritation, the patient was advised to reduce the amount of the drug applied or use it less frequently.   The patient verbalized understanding of the proper use and possible adverse effects of clindamycin.  All of the patient's questions and concerns were addressed. Dapsone Counseling: I discussed with the patient the risks of dapsone including but not limited to hemolytic anemia, agranulocytosis, rashes, methemoglobinemia, kidney failure, peripheral neuropathy, headaches, GI upset, and liver toxicity.  Patients who start dapsone require monitoring including baseline LFTs and weekly CBCs for the first month, then every month thereafter.  The patient verbalized understanding of the proper use and possible adverse effects of dapsone.  All of the patient's questions and concerns were addressed. Spironolactone Pregnancy And Lactation Text: This medication can cause feminization of the male fetus and should be avoided during pregnancy. The active metabolite is also found in breast milk. Winlevi Counseling:  I discussed with the patient the risks of topical clascoterone including but not limited to erythema, scaling, itching, and stinging. Patient voiced their understanding. Erythromycin Pregnancy And Lactation Text: This medication is Pregnancy Category B and is considered safe during pregnancy. It is also excreted in breast milk. Azelaic Acid Counseling: Patient counseled that medicine may cause skin irritation and to avoid applying near the eyes.  In the event of skin irritation, the patient was advised to reduce the amount of the drug applied or use it less frequently.   The patient verbalized understanding of the proper use and possible adverse effects of azelaic acid.  All of the patient's questions and concerns were addressed. Doxycycline Pregnancy And Lactation Text: This medication is Pregnancy Category D and not consider safe during pregnancy. It is also excreted in breast milk but is considered safe for shorter treatment courses. Aklief counseling:  Patient advised to apply a pea-sized amount only at bedtime and wait 30 minutes after washing their face before applying.  If too drying, patient may add a non-comedogenic moisturizer.  The most commonly reported side effects including irritation, redness, scaling, dryness, stinging, burning, itching, and increased risk of sunburn.  The patient verbalized understanding of the proper use and possible adverse effects of retinoids.  All of the patient's questions and concerns were addressed. Tazorac Counseling:  Patient advised that medication is irritating and drying.  Patient may need to apply sparingly and wash off after an hour before eventually leaving it on overnight.  The patient verbalized understanding of the proper use and possible adverse effects of tazorac.  All of the patient's questions and concerns were addressed. Topical Retinoid counseling:  Patient advised to apply a pea-sized amount only at bedtime and wait 30 minutes after washing their face before applying.  If too drying, patient may add a non-comedogenic moisturizer. The patient verbalized understanding of the proper use and possible adverse effects of retinoids.  All of the patient's questions and concerns were addressed. Azithromycin Counseling:  I discussed with the patient the risks of azithromycin including but not limited to GI upset, allergic reaction, drug rash, diarrhea, and yeast infections. Isotretinoin Pregnancy And Lactation Text: This medication is Pregnancy Category X and is considered extremely dangerous during pregnancy. It is unknown if it is excreted in breast milk. Benzoyl Peroxide Counseling: Patient counseled that medicine may cause skin irritation and bleach clothing.  In the event of skin irritation, the patient was advised to reduce the amount of the drug applied or use it less frequently.   The patient verbalized understanding of the proper use and possible adverse effects of benzoyl peroxide.  All of the patient's questions and concerns were addressed. Detail Level: Detailed

## 2024-04-20 NOTE — ED ADULT NURSE NOTE - NSFALLCONCLUSION_ED_ALL_ED
Universal Safety Interventions
Patient requests all Lab, Cardiology, and Radiology Results on their Discharge Instructions

## 2024-07-10 NOTE — ED ADULT NURSE NOTE - NSHOSCREENINGQ1_ED_ALL_ED
[facility]    Consult               Today's Date: 7/10/2024  Patient Name: Alberto Torres  Date of admission: 7/8/2024  9:51 AM  Patient's age: 53 y.o., 1970  Admission Dx: Acute ischemic left MCA stroke (HCC) [I63.512]  Cerebrovascular accident (CVA) due to occlusion of left middle cerebral artery (HCC) [I63.512]  Cerebrovascular accident (CVA) due to embolism of left middle cerebral artery (HCC) [I63.412]    Requesting Physician: Axel Sierra, DO    Cardiac Evaluation Reason:  History of atrial fibrillation/atrial flutter    History Obtained From: patient and chart review     History of Present Illness:    The patient is a 53 y.o. year old male with a PMHx of HTN, atrial fibrillation and atrial flutter s/p multiple cardioversions and ablation procedure (on Coumadin), and tobacco use transferred from OSH on 7/8 due to acute ischemic left MCA stroke after presenting with right sided drooling from the mouth, facial droop, and aphasia. Last known well 7/7. Patient reports he was released from senior living in May and has been living at a longterm house, which he also recently left. Since then, he has been unable to get any refills of his medications, including the coumadin he was taking for atrial fibrillation. He has not been taking it for over a week. He was finally able to establish with a PCP who referred him to Dr. Riley Stuart at [facility], and he was at his first cardiology appointment when his stroke symptoms began. Patient is asymptomatic at this time and his deficits have improved.    Past Medical History:   has a past medical history of Anxiety, Atrial fibrillation (HCC), Bipolar 1 disorder (HCC), Clinical trial participant, Depression, Hypertension, and Lactose intolerance.    Past Surgical History:   has no past surgical history on file.     Home Medications:    Prior to Admission medications    Medication Sig Start Date End Date Taking? Authorizing Provider   doxycycline (MONODOX) 100 MG  capsule Take 1 capsule by mouth 2 times daily 7/14/15   Dale Feliciano MD   lisinopril (PRINIVIL;ZESTRIL) 10 MG tablet Take 1 tablet by mouth daily 7/14/15   Dale Feliciano MD   BusPIRone HCl (BUSPAR PO) Take  by mouth. unknown    Provider, MD Nereida   ipratropium (ATROVENT) 0.03 % nasal spray 2 sprays by Nasal route 2 times daily. 10/23/13   Juanito Arthur DO   guaiFENesin (MUCINEX) 600 MG SR tablet Take 1 tablet by mouth 2 times daily. 10/23/13   Juanito Arthur DO     Allergies:  Patient has no known allergies.    Social History:   reports that he has been smoking cigarettes. He has a 13.0 pack-year smoking history. He uses smokeless tobacco. He reports current alcohol use. He reports current drug use. Drugs: Cocaine and Marijuana (Weed).     Family History: family history is not on file.     REVIEW OF SYSTEMS:    Constitutional: Negative for fatigue, weight loss, loss of appetite   Cardiovascular: as per HPI  Respiratory: as per HPI  Gastrointestinal: Negative for abdominal pain, N/V  Genitourinary: No dysuria, trouble voiding, or hematuria.  Musculoskeletal:  No gait disturbance, No weakness or joint complaints.  Neurological: No headache, diplopia, change in muscle strength, numbness or tingling. No change in gate.   Endocrine: No temperature intolerance. No excessive thirst, fluid intake, or urination. No tremor.  Hematologic/Lymphatic: No abnormal bruising or bleeding    PHYSICAL EXAM:      /79   Pulse 90   Temp 98.1 °F (36.7 °C) (Oral)   Resp 16   Ht 1.854 m (6' 1\")   Wt 74.8 kg (165 lb)   SpO2 98%   BMI 21.77 kg/m²    Constitutional and General Appearance: alert, cooperative, in no distress   HEENT: atraumatic, normocephalic.   Respiratory:  Clear to auscultation bilaterally  Cardiovascular:  Irregularly irregular S1 and S2.  No JVD  Peripheral pulses are symmetrical and full   Abdomen:   Soft, non tender   Bowel sounds present  Extremities:  No BLE edema or cyanosis  No

## 2025-02-19 NOTE — ED ADULT NURSE NOTE - NS ED NURSE LEVEL OF CONSCIOUSNESS AFFECT
Patient said his [facility] papers are due tomorrow 2/20/25 - I do not see a pre-scan or final scan in Media.    Please call patient  #[phone]  
Return call placed to patient to determine when/if  forms were dropped off in clinic.     Patient returned call and reports that his mother is taking the forms to his PCP who is also providing patient with a disability placard.    
Calm